# Patient Record
Sex: FEMALE | Race: BLACK OR AFRICAN AMERICAN | Employment: OTHER | URBAN - METROPOLITAN AREA
[De-identification: names, ages, dates, MRNs, and addresses within clinical notes are randomized per-mention and may not be internally consistent; named-entity substitution may affect disease eponyms.]

---

## 2019-03-24 ENCOUNTER — HOSPITAL ENCOUNTER (INPATIENT)
Age: 29
LOS: 3 days | Discharge: HOME | DRG: 885 | End: 2019-03-27
Attending: EMERGENCY MEDICINE | Admitting: PSYCHIATRY & NEUROLOGY
Payer: MEDICARE

## 2019-03-24 DIAGNOSIS — R45.851 SUICIDAL IDEATION: Primary | ICD-10-CM

## 2019-03-24 DIAGNOSIS — F39 MOOD DISORDER (HCC): ICD-10-CM

## 2019-03-24 PROBLEM — F32.A DEPRESSION WITH SUICIDAL IDEATION: Status: ACTIVE | Noted: 2019-03-24

## 2019-03-24 PROBLEM — F25.1 SCHIZOAFFECTIVE DISORDER, DEPRESSIVE TYPE (HCC): Status: ACTIVE | Noted: 2019-03-24

## 2019-03-24 PROBLEM — F33.2 SEVERE EPISODE OF RECURRENT MAJOR DEPRESSIVE DISORDER, WITHOUT PSYCHOTIC FEATURES (HCC): Status: ACTIVE | Noted: 2019-03-24

## 2019-03-24 LAB
ACETAMINOPHEN LEVEL: <5 MCG/ML (ref 10–30)
ALBUMIN SERPL-MCNC: 4.1 G/DL (ref 3.5–5.2)
ALP BLD-CCNC: 93 U/L (ref 35–104)
ALT SERPL-CCNC: 13 U/L (ref 0–32)
AMPHETAMINE SCREEN, URINE: NOT DETECTED
ANION GAP SERPL CALCULATED.3IONS-SCNC: 12 MMOL/L (ref 7–16)
AST SERPL-CCNC: 15 U/L (ref 0–31)
BACTERIA: ABNORMAL /HPF
BACTERIA: ABNORMAL /HPF
BARBITURATE SCREEN URINE: NOT DETECTED
BENZODIAZEPINE SCREEN, URINE: NOT DETECTED
BILIRUB SERPL-MCNC: <0.2 MG/DL (ref 0–1.2)
BILIRUBIN URINE: NEGATIVE
BILIRUBIN URINE: NEGATIVE
BLOOD, URINE: NEGATIVE
BLOOD, URINE: NEGATIVE
BUN BLDV-MCNC: 14 MG/DL (ref 6–20)
CALCIUM SERPL-MCNC: 9.2 MG/DL (ref 8.6–10.2)
CANNABINOID SCREEN URINE: NOT DETECTED
CHLORIDE BLD-SCNC: 100 MMOL/L (ref 98–107)
CLARITY: CLEAR
CLARITY: CLEAR
CO2: 25 MMOL/L (ref 22–29)
COCAINE METABOLITE SCREEN URINE: NOT DETECTED
COLOR: YELLOW
COLOR: YELLOW
CREAT SERPL-MCNC: 1.1 MG/DL (ref 0.5–1)
EPITHELIAL CELLS, UA: ABNORMAL /HPF
ETHANOL: <10 MG/DL (ref 0–0.08)
GFR AFRICAN AMERICAN: >60
GFR NON-AFRICAN AMERICAN: >60 ML/MIN/1.73
GLUCOSE BLD-MCNC: 161 MG/DL (ref 74–99)
GLUCOSE URINE: 100 MG/DL
GLUCOSE URINE: NEGATIVE MG/DL
HBA1C MFR BLD: 5.7 % (ref 4–5.6)
HCG, URINE, POC: NEGATIVE
HCT VFR BLD CALC: 33.8 % (ref 34–48)
HEMOGLOBIN: 9.9 G/DL (ref 11.5–15.5)
KETONES, URINE: ABNORMAL MG/DL
KETONES, URINE: NEGATIVE MG/DL
LEUKOCYTE ESTERASE, URINE: ABNORMAL
LEUKOCYTE ESTERASE, URINE: NEGATIVE
Lab: NORMAL
MCH RBC QN AUTO: 20.8 PG (ref 26–35)
MCHC RBC AUTO-ENTMCNC: 29.3 % (ref 32–34.5)
MCV RBC AUTO: 70.9 FL (ref 80–99.9)
METER GLUCOSE: 105 MG/DL (ref 74–99)
METHADONE SCREEN, URINE: NOT DETECTED
NEGATIVE QC PASS/FAIL: NORMAL
NITRITE, URINE: NEGATIVE
NITRITE, URINE: NEGATIVE
OPIATE SCREEN URINE: NOT DETECTED
PDW BLD-RTO: 16.3 FL (ref 11.5–15)
PH UA: 5.5 (ref 5–9)
PH UA: 6 (ref 5–9)
PHENCYCLIDINE SCREEN URINE: NOT DETECTED
PLATELET # BLD: 470 E9/L (ref 130–450)
PMV BLD AUTO: 10.5 FL (ref 7–12)
POSITIVE QC PASS/FAIL: NORMAL
POTASSIUM SERPL-SCNC: 3.8 MMOL/L (ref 3.5–5)
PROPOXYPHENE SCREEN: NOT DETECTED
PROTEIN UA: 100 MG/DL
PROTEIN UA: NEGATIVE MG/DL
RBC # BLD: 4.77 E12/L (ref 3.5–5.5)
RBC UA: ABNORMAL /HPF (ref 0–2)
RBC UA: ABNORMAL /HPF (ref 0–2)
SALICYLATE, SERUM: <0.3 MG/DL (ref 0–30)
SODIUM BLD-SCNC: 137 MMOL/L (ref 132–146)
SPECIFIC GRAVITY UA: 1.02 (ref 1–1.03)
SPECIFIC GRAVITY UA: >=1.03 (ref 1–1.03)
TOTAL PROTEIN: 7.9 G/DL (ref 6.4–8.3)
TRICHOMONAS: ABNORMAL /HPF
TRICYCLIC ANTIDEPRESSANTS SCREEN SERUM: NEGATIVE NG/ML
UROBILINOGEN, URINE: 0.2 E.U./DL
UROBILINOGEN, URINE: 0.2 E.U./DL
WBC # BLD: 11.8 E9/L (ref 4.5–11.5)
WBC UA: ABNORMAL /HPF (ref 0–5)
WBC UA: ABNORMAL /HPF (ref 0–5)

## 2019-03-24 PROCEDURE — 99285 EMERGENCY DEPT VISIT HI MDM: CPT

## 2019-03-24 PROCEDURE — 6370000000 HC RX 637 (ALT 250 FOR IP): Performed by: NURSE PRACTITIONER

## 2019-03-24 PROCEDURE — 81001 URINALYSIS AUTO W/SCOPE: CPT

## 2019-03-24 PROCEDURE — 82962 GLUCOSE BLOOD TEST: CPT

## 2019-03-24 PROCEDURE — 83036 HEMOGLOBIN GLYCOSYLATED A1C: CPT

## 2019-03-24 PROCEDURE — 6370000000 HC RX 637 (ALT 250 FOR IP): Performed by: FAMILY MEDICINE

## 2019-03-24 PROCEDURE — 80053 COMPREHEN METABOLIC PANEL: CPT

## 2019-03-24 PROCEDURE — 99222 1ST HOSP IP/OBS MODERATE 55: CPT | Performed by: NURSE PRACTITIONER

## 2019-03-24 PROCEDURE — 36415 COLL VENOUS BLD VENIPUNCTURE: CPT

## 2019-03-24 PROCEDURE — 85027 COMPLETE CBC AUTOMATED: CPT

## 2019-03-24 PROCEDURE — G0480 DRUG TEST DEF 1-7 CLASSES: HCPCS

## 2019-03-24 PROCEDURE — 80307 DRUG TEST PRSMV CHEM ANLYZR: CPT

## 2019-03-24 PROCEDURE — 1240000000 HC EMOTIONAL WELLNESS R&B

## 2019-03-24 RX ORDER — ACETAMINOPHEN 325 MG/1
650 TABLET ORAL EVERY 4 HOURS PRN
Status: DISCONTINUED | OUTPATIENT
Start: 2019-03-24 | End: 2019-03-27 | Stop reason: HOSPADM

## 2019-03-24 RX ORDER — HYDROXYZINE PAMOATE 50 MG/1
50 CAPSULE ORAL EVERY 6 HOURS PRN
Status: DISCONTINUED | OUTPATIENT
Start: 2019-03-24 | End: 2019-03-27 | Stop reason: HOSPADM

## 2019-03-24 RX ORDER — LISINOPRIL 5 MG/1
5 TABLET ORAL DAILY
Status: DISCONTINUED | OUTPATIENT
Start: 2019-03-24 | End: 2019-03-27 | Stop reason: HOSPADM

## 2019-03-24 RX ORDER — DIPHENHYDRAMINE HCL 25 MG
25 TABLET ORAL 2 TIMES DAILY
Status: DISCONTINUED | OUTPATIENT
Start: 2019-03-24 | End: 2019-03-27 | Stop reason: HOSPADM

## 2019-03-24 RX ORDER — OLANZAPINE 10 MG/1
10 TABLET ORAL
Status: ACTIVE | OUTPATIENT
Start: 2019-03-24 | End: 2019-03-24

## 2019-03-24 RX ORDER — MAGNESIUM HYDROXIDE/ALUMINUM HYDROXICE/SIMETHICONE 120; 1200; 1200 MG/30ML; MG/30ML; MG/30ML
30 SUSPENSION ORAL PRN
Status: DISCONTINUED | OUTPATIENT
Start: 2019-03-24 | End: 2019-03-27 | Stop reason: HOSPADM

## 2019-03-24 RX ORDER — BENZTROPINE MESYLATE 1 MG/ML
2 INJECTION INTRAMUSCULAR; INTRAVENOUS 2 TIMES DAILY PRN
Status: DISCONTINUED | OUTPATIENT
Start: 2019-03-24 | End: 2019-03-27 | Stop reason: HOSPADM

## 2019-03-24 RX ORDER — DESVENLAFAXINE 50 MG/1
50 TABLET, EXTENDED RELEASE ORAL DAILY
Status: DISCONTINUED | OUTPATIENT
Start: 2019-03-24 | End: 2019-03-27 | Stop reason: HOSPADM

## 2019-03-24 RX ORDER — TRAZODONE HYDROCHLORIDE 50 MG/1
50 TABLET ORAL NIGHTLY PRN
Status: DISCONTINUED | OUTPATIENT
Start: 2019-03-24 | End: 2019-03-27 | Stop reason: HOSPADM

## 2019-03-24 RX ORDER — METRONIDAZOLE 500 MG/1
2000 TABLET ORAL ONCE
Status: COMPLETED | OUTPATIENT
Start: 2019-03-24 | End: 2019-03-24

## 2019-03-24 RX ORDER — HALOPERIDOL 5 MG
5 TABLET ORAL 2 TIMES DAILY
Status: DISCONTINUED | OUTPATIENT
Start: 2019-03-24 | End: 2019-03-27 | Stop reason: HOSPADM

## 2019-03-24 RX ORDER — HALOPERIDOL 5 MG/ML
10 INJECTION INTRAMUSCULAR EVERY 6 HOURS PRN
Status: DISCONTINUED | OUTPATIENT
Start: 2019-03-24 | End: 2019-03-27 | Stop reason: HOSPADM

## 2019-03-24 RX ADMIN — HALOPERIDOL 5 MG: 5 TABLET ORAL at 09:55

## 2019-03-24 RX ADMIN — LISINOPRIL 5 MG: 5 TABLET ORAL at 17:04

## 2019-03-24 RX ADMIN — DIPHENHYDRAMINE HCL 25 MG: 25 TABLET ORAL at 09:55

## 2019-03-24 RX ADMIN — METRONIDAZOLE 2000 MG: 500 TABLET, FILM COATED ORAL at 21:41

## 2019-03-24 RX ADMIN — DESVENLAFAXINE SUCCINATE 50 MG: 50 TABLET, FILM COATED, EXTENDED RELEASE ORAL at 09:55

## 2019-03-24 RX ADMIN — HALOPERIDOL 5 MG: 5 TABLET ORAL at 21:41

## 2019-03-24 RX ADMIN — DIPHENHYDRAMINE HCL 25 MG: 25 TABLET ORAL at 21:41

## 2019-03-24 ASSESSMENT — SLEEP AND FATIGUE QUESTIONNAIRES
SLEEP PATTERN: DIFFICULTY FALLING ASLEEP;DISTURBED/INTERRUPTED SLEEP;INSOMNIA;RESTLESSNESS
DIFFICULTY FALLING ASLEEP: YES
DIFFICULTY STAYING ASLEEP: YES
RESTFUL SLEEP: NO
DIFFICULTY ARISING: YES
DO YOU USE A SLEEP AID: NO
DO YOU HAVE DIFFICULTY SLEEPING: YES

## 2019-03-24 ASSESSMENT — PATIENT HEALTH QUESTIONNAIRE - PHQ9: SUM OF ALL RESPONSES TO PHQ QUESTIONS 1-9: 27

## 2019-03-24 ASSESSMENT — LIFESTYLE VARIABLES: HISTORY_ALCOHOL_USE: YES

## 2019-03-24 NOTE — ED PROVIDER NOTES
U/L    ALT 13 0 - 32 U/L    AST 15 0 - 31 U/L   Serum Drug Screen   Result Value Ref Range    Ethanol Lvl <10 mg/dL    Acetaminophen Level <5.0 (L) 10.0 - 50.7 mcg/mL    Salicylate, Serum <8.8 0.0 - 30.0 mg/dL    TCA Scrn NEGATIVE Cutoff:300 ng/mL   Urine Drug Screen   Result Value Ref Range    Amphetamine Screen, Urine NOT DETECTED Negative <1000 ng/mL    Barbiturate Screen, Ur NOT DETECTED Negative < 200 ng/mL    Benzodiazepine Screen, Urine NOT DETECTED Negative < 200 ng/mL    Cannabinoid Scrn, Ur NOT DETECTED Negative < 50ng/mL    Cocaine Metabolite Screen, Urine NOT DETECTED Negative < 300 ng/mL    Opiate Scrn, Ur NOT DETECTED Negative < 300ng/mL    PCP Screen, Urine NOT DETECTED Negative < 25 ng/mL    Methadone Screen, Urine NOT DETECTED Negative <300 ng/mL    Propoxyphene Scrn, Ur NOT DETECTED Negative <300 ng/mL   Urinalysis   Result Value Ref Range    Color, UA Yellow Straw/Yellow    Clarity, UA Clear Clear    Glucose, Ur 100 (A) Negative mg/dL    Bilirubin Urine Negative Negative    Ketones, Urine TRACE (A) Negative mg/dL    Specific Gravity, UA >=1.030 1.005 - 1.030    Blood, Urine Negative Negative    pH, UA 5.5 5.0 - 9.0    Protein,  (A) Negative mg/dL    Urobilinogen, Urine 0.2 <2.0 E.U./dL    Nitrite, Urine Negative Negative    Leukocyte Esterase, Urine TRACE (A) Negative   Microscopic Urinalysis   Result Value Ref Range    WBC, UA 2-5 0 - 5 /HPF    RBC, UA 0-1 0 - 2 /HPF    Epi Cells MANY /HPF    Bacteria, UA MODERATE (A) /HPF   POC Pregnancy Urine Qual   Result Value Ref Range    HCG, Urine, POC Negative Negative    Lot Number [id number]     Positive QC Pass/Fail Pass     Negative QC Pass/Fail Pass        RADIOLOGY:  Interpreted by Radiologist.  No orders to display         ------------------------- NURSING NOTES AND VITALS REVIEWED ---------------------------   The nursing notes within the ED encounter and vital signs as below have been reviewed by myself.   BP (!) 150/104   Pulse 112   Temp 97 °F (36.1 °C)   Resp 18   Ht 5' 7\" (1.702 m)   Wt 220 lb (99.8 kg)   SpO2 99%   BMI 34.46 kg/m²   Oxygen Saturation Interpretation: Normal    The patients available past medical records and past encounters were reviewed. ------------------------------ ED COURSE/MEDICAL DECISION MAKING----------------------  Medications - No data to display    Medical Decision Making:    Patient assessed. Labs Will be reviewed and  to evaluate  This patient's ED course included: a personal history and physical examination and re-evaluation prior to disposition. This patient has been closely monitored during their ED course. Re-Evaluations:           Re-evaluation. Patients symptoms show no change. Patient is  medically clear  Consultations:               Counseling: The emergency provider has spoken with the patient and discussed todays results, in addition to providing specific details for the plan of care and counseling regarding the diagnosis and prognosis. Questions are answered at this time and they are agreeable with the plan.     --------------------------------- IMPRESSION AND DISPOSITION ---------------------------------    IMPRESSION  1. Suicidal ideation    2. Mood disorder ([facility] [address]        DISPOSITION  Disposition:  to evaluate  Patient condition is stable    3/24/19, 1:44 AM.    This note is prepared by Damien Arguelles, acting as Scribe for Zachariah Patel MD.    Zachariah Patel MD:  The scribe's documentation has been prepared under my direction and personally reviewed by me in its entirety. I confirm that the note above accurately reflects all work, treatment, procedures, and medical decision making performed by me.          Zachariah Patel MD  03/24/19 4083       Zachariah Patel MD  03/24/19 9988

## 2019-03-24 NOTE — ED NOTES
Pt is alert, calm, and cooperative. States she had a fight with her brother tonight and that prompted her visit. Pt states SI and HI. This has been worsening since last September. Pt moved here from [address] [place] [address] last July. Pt reports multiple plans such as making a bomb, knifing self, shooting self with a gun, or poisoning self. Pt has has multiple SA in 2015, 2016, 2017, and 2018 with attempted hanging, cutting wrist, and had a gun in her hand at one time and cousin found her. .Pt was in treatment there at [facility] where she saw a Psychiatrist and therapist.. Pt states she was on \"pills and shots\". Hx of HTN, Asthma, Borderline diabetic, Schizophrenia, Bipolar, Depression. Pt resides here in [place] and her best friend and his son are staying with her. Pt has 4 children who resides in [address] [place] [address] with her Step-Mom until she \"gets it together\". Pt had used [employer] in [address] [place] [address]. Pt reports she has been up for nearly 6 days straight. Reports AH telling her to \"get them\" or harm self. Pt has VH of \"little people running around\". Labs drawn and sent. Pt eating and resting in room.  Pt is courtney Myrick RN  03/24/19 [address] [place] [address] Dione Christopher RN  03/24/19 9757

## 2019-03-24 NOTE — H&P
PSYCHIATRIC EVALUATION  (HISTORY & PHYSICAL)     CHIEF COMPLAINT:   [x] Mood Problems [x] Anxiety Problems [] Psychosis                    [x] Suicidal/Homicidal   [] Aggression  [] Other    HISTORY OF PRESENT ILLNESS: Jihan Lemus  is a 25 y.o. female who has a previous psychiatric history of schizoaffective disorder presents for admission with SI with plan to stab herself and HI toward brother. Symptoms onset was a few months ago and is becoming severe for the last day. This presentation associates with [employer] telling her to \"get 'em\", increased depression/anxiety, and poor sleep. Symptoms are constant and usually is worsened by medication noncompliance. Precipitating factor:  Patient had altercation with friend that is like a brother and his girlfriend. Precipitating Factors:     [x] Family Stress   [] Recent loss/grief Stress   [] Health Stress   [] Relationship Stress    [] Legal Stress   [x] Environmental Stress    [] Occupational Stress   [] Financial Stress   [] Substance Abuse [] Other      PAST PSYCHIATRIC HISTORY:     History of psychiatric Hospitalization:    [] Denies    [x] yes  [] Days ago     []  Weeks Ago    [] Months ago  [] Years ago              [] [facility]  [] [facility]  [] Other:        [] Once  [x] More than once    Outpatient treatment:  [] [facility]  [] [facility]  [] [facility]              [] [facility]  [] [facility]      [] [address] [place] [address] [] [facility]      [] [facility] [] [facility]  [] [facility] [] [facility]  [] Other               [] currently  [] in the past  [] Non-Compliant    [] Denies    Previous suicide attempt: []Denies                [x] yes  [] OD  [] Cutting  [] Hanging  [] Gun  [] Other    Previous psych medications:  [x] Was prescribed               [] Currently Taking       [] Never taken medications      PAST MEDICAL HISTORY: History reviewed. No pertinent past medical history. FAMILY PSYCHIATRIC HISTORY:  History reviewed. No pertinent family history.      [] Denies      [x] Endorses    [] Father     [] Depression  [] Anxiety  [] Bipolar  [] Psychosis  []  Other      [x] Mother    [] Depression  [] Anxiety  [] Bipolar  [] Psychosis  []  Other      [] Sibling    [] Depression  [] Anxiety  [] Bipolar  [] Psychosis  []  Other      [] Grandparent    [] Depression  [] Anxiety  [] Bipolar  [] Psychosis  []  Other      SOCIAL HISTORY:     1. Living Situation:[x] Private Residence [] Homeless [] [address]             [] Assisted Living [] [address]  [] Shelter [] Other   2. Employment:  [] Yes  [] No   [] Disability   [] Retired  3. Legal History: [x] No Arrest [] Arrest  [] Theft  []  Assault  [] Substances   4. History of Trama/ Abuse: [] Denies  [] Emotional [] Physical [] Sexual   5. Spirituality: [] Spiritual [x] Not Spiritual   6. Substance Abuse: [x] Denies  [x] Drug of choice      [] Amphetamines [x] Marijuana [x] Cocaine      [] Opioids  [] Alcohol  [] Benzodiazepines   [] Other: For further SH review SW note. Risk Assessment:  1. Risk Factors:   [x] Depression  [x] Anxiety  [] Psychosis   [x] Suicidal/Homicidal Thoughts [] Suicide Attempt [] Substance Abuse     2. Protective Factors: [x] Controlled Environment         [x] Supportive Family []         [] Christianity Support     3. Level of Risk: [] Mild [] Moderate [x] Severe      Strengths & Weaknesses:    1. Strengths: [x] Ability to communicate feelings     [x] Independent ADL's     [x] Supportive Family    [] Current Health Status     2.  Weaknesses: [x] Emotional          [x] Motivational     MEDICATIONS: Current Facility-Administered Medications: acetaminophen (TYLENOL) tablet 650 mg, 650 mg, Oral, Q4H PRN  hydrOXYzine (VISTARIL) capsule 50 mg, 50 mg, Oral, Q6H PRN  OLANZapine (ZYPREXA) tablet 10 mg, 10 mg, Oral, Once PRN  haloperidol lactate (HALDOL) injection 10 mg, 10 mg, Intramuscular, Q6H PRN  traZODone (DESYREL) tablet 50 mg, 50 mg, Oral, Nightly PRN  benztropine mesylate (COGENTIN) injection 2 mg, 2 mg, Intramuscular, BID PRN  magnesium hydroxide (MILK OF MAGNESIA) 400 MG/5ML suspension 30 mL, 30 mL, Oral, Daily PRN  aluminum & magnesium hydroxide-simethicone (MAALOX) 200-200-20 MG/5ML suspension 30 mL, 30 mL, Oral, PRN    Medical Review of Systems:     All other than marked systmes have been reviewed and are all negative.     Constitutional Symptoms: []  fever []  Chills  Skin Symptoms: [] rash []  Pruritus   Eye Symptoms: [] Vision unchanged []  recent vision problems[] blurred vision   Respiratory Symptoms:[] shortness of breath [] cough  Cardiovascular Symptoms:  [] chest pain   [] palpitations   Gastrointestinal Symptoms: []  abdominal pain []  nausea []  vomiting []  diarrhea  Genitourinary Symptoms: []  dysuria  []  hematuria   Musculoskeletal Symptoms: []  back pain []  muscle pain []  joint pain  Neurologic Symptoms: []  headache []  dizziness  Hematolymphoid Symptoms: [] Adenopathy [] Bruises   [] Schimosis       VITALS: BP (!) 163/99   Pulse 93   Temp 97.3 °F (36.3 °C)   Resp 16   Ht 5' 7\" (1.702 m)   Wt 220 lb (99.8 kg)   SpO2 98%   BMI 34.46 kg/m²     ALLERGIES: Nuts [peanut-containing drug products]            Physical Examination:    Head:  [x] Atraumatic:  [x] normocephalic  Skin and Mucosa       [] Moist [] Dry [] Pale [x] Normal   Neck: [x] Thyroid [] Palpable    [x] Not palpable []  venus distention [] adenopathy   Chest: [x] Clear [] Rhonchi  [] Wheezing   CV: [x] S1 [x] S2 [x] No murmer   Abdomen:  [x] Soft   [] Tender  [] Viceromegaly   Extremities:  [x] No Edema   [] Edema    Cranial Nerves Examination:    CN II: [x] Pupils are reactive to light [] Pupils are non reactive to light  CN III, IV, VI:[x] No eye deviation  [x] No diplopia or ptosis   CN V: [x] Facial Sensation is intact  [] Facial Sensation is not intact   CN IIIV:  [x] Hearing is normal to rubbing fingers   CN IX, X:  [x] Normal gag reflex and phonation   CN XI: [x] Shoulder shrug and neck rotation is normal  CNXII: [x] Tongue is midline no deviation or atrophy       For further PE refer to ED note      MENTAL STATUS EXAM:       Cognition:      [x] Alert  [x] Awake  [x] Oriented  [x] Person  [x] Place [x] Time      [] drowsy  [] tired  [] lethargic  [] distractable     Attention/Concentration:   [x] Attentive  [] Distracted        Memory Recent and Remote: [] Intact   [] Impaired [] Partially Impaired     Language: [] Able to recognize and name objects          [] Unable to recognize and name Objects    Fund of Knowledge:  [] Poor [x]  Fair  [] Good    Speech: [] Normal  [x] Soft  [] Slow  [] Fast [] Pressured            [] Loud [] Dysarthria  [] Incoherent       Appearance: [] Well Groomed  [] Casual Dressed  [] Unkept  [] Disheveled          [] Normal weight  [] Thin  [x] Overweight  [] Obese           Attitude: [] Positive  [] Hostile  [] Demanding  [] Guarded  [] Defensive         [x] Cooperative  []  Uncooperative      Behavior:  [x] Normal Gait  [] Abnormal Gait [] Walks with Assistance  [] [place]     [] Walks with Shant Older  [] In Hospital Bed  [] Sitting in Chair    Muscle-Skeletal:  [x] Normal Muscle Tone [] Muscle Atrophy            [] Abnormal Muscle Movement     Eye Contact:  [] Good eye contact  [] Intermittent Eye Contact  [x] Poor Eye Contact    [] Excessive Eye Contact   [] Intrusive Eye Contact    Mood: [x] Depressed  [x] Anxious  [] Irritated  [] Euthymic   [] Angry [] Restless                   [] Apathetic    Affect:  [x] Congruent  [] Incongruent  [] Labile  [] Constricted  [x] Flat  [x] Bizarre                     [] Heightened    [] Exaggerated      Thought Process and Association:  [] Logical [] Illogical       [x] Linear and Goal Directed  [] Tangential  [] Circumstantial     Thought Content:  [] Denies [x] Endorses [x] Suicidal [x] Homicidal  [] Delusional      [] Paranoid  [] Somatic  [] Grandiose    Perception: []  None  [x] Auditory   [] Visual  [] tactile   [] olfactory  []

## 2019-03-24 NOTE — CONSULTS
Hospital Medicine consult     Date of Admission: 3/24/2019    Chief Complaint:  Prediabetes in psych     History Of Present Illness:      25 y.o. female who presented to [facility] presented with SI. He was medically cleared in the ED and sent to inpatient psych. Prediabetes  And HTN noted in past. She has been noncompliant with meds for several months. The patient denies any fever, cough, chest pain, shortness of breath, nausea and vomiting. No dysuria. Past Medical History:          Diagnosis Date    HTN (hypertension)     Prediabetes        Past Surgical History:      History reviewed. No pertinent surgical history. Medications Prior to Admission:      Prior to Admission medications    Not on File       Allergies:  Nuts [peanut-containing drug products]    Social History:    TOBACCO:   has no tobacco history on file. ETOH:   reports that she drank alcohol. Family History:    History reviewed. No pertinent family history. REVIEW OF SYSTEMS:   Pertinent positives as noted in the HPI. All other systems reviewed and negative. PHYSICAL EXAM:  BP (!) 163/99   Pulse 93   Temp 97.3 °F (36.3 °C)   Resp 16   Ht 5' 7\" (1.702 m)   Wt 220 lb (99.8 kg)   SpO2 98%   BMI 34.46 kg/m²   General appearance: No apparent distress, appears stated age and cooperative. HEENT: Normal cephalic, atraumatic without obvious deformity. Pupils equal, round, and reactive to light. Extra ocular muscles intact. Conjunctivae/corneas clear. Neck: Supple, with full range of motion. No jugular venous distention. Trachea midline. Respiratory:  Normal respiratory effort. Clear to auscultation, bilaterally without Rales/Wheezes/Rhonchi. Cardiovascular: Regular rate and rhythm with normal S1/S2 without murmurs, rubs or gallops. Abdomen: Soft, non-tender, non-distended with normal bowel sounds. Musculoskeletal: No clubbing, cyanosis or edema bilaterally. Full range of motion without deformity.   Skin: Normal skin

## 2019-03-24 NOTE — CARE COORDINATION
HAYLEY note: d/c planning: SW met with pt and completed assessment and columbia scale. Pt states she had was getting shots at [facility] in [address] [place] [address] and then moved here and hadn't followed with anyone here. Pt states she hasn't gotten an injection since august 2017 and would like to resume injections. Pt states that she can return home to her own home. She had allowed her bro and his son to live with her but they aren't living there anymore. Pt states she has no one she is close to. She gets ss and is on medicare.

## 2019-03-24 NOTE — PROGRESS NOTES
Pt contract for safety and has no current suicidal thoughts but had homicidal thoughts earlier today but stated after a nap she is thinking much clearer and no longer homicidal. Pt also stated that the voices she was hearing to kill her friend disappeared after her nap as well.

## 2019-03-24 NOTE — PROGRESS NOTES
Patient declined to attend community meeting. Attempted to have patient identify goal for the day but patient was asleep AEB snoring.

## 2019-03-24 NOTE — BH NOTE
`[facility]  Admission Note   Patient comes to the unit and states that she's been off of her meds for a while and came in for treatment. When asked to elaborate as to the specific reasons that brought her to the hospital as told to the previous nurse in the [facility] patient became evasive and guarded. Admission Type:   Admission Type: Involuntary    Reason for admission:  Reason for Admission: \"I've been off my medicine\"    PATIENT STRENGTHS:  Strengths: No significant Physical Illness    Patient Strengths and Limitations:  Limitations: Difficult relationships / poor social skills, Demonstrates discomfort with /lack of social skills, Apathetic / unmotivated, Lacks leisure interests, Tendency to isolate self    Addictive Behavior:   Addictive Behavior  In the past 3 months, have you felt or has someone told you that you have a problem with:  : None  Do you have a history of Chemical Use?: No  Do you have a history of Alcohol Use?: Yes  Do you have a history of Street Drug Abuse?: Yes  Histroy of Prescripton Drug Abuse?: No    Medical Problems:   History reviewed. No pertinent past medical history. Status EXAM:  Status and Exam  Normal: No  Facial Expression: Avoids Gaze  Affect: Blunt  Level of Consciousness: Alert  Mood:Normal: No  Mood: Depressed  Motor Activity:Normal: Yes  Interview Behavior: Cooperative  Preception: [place] to Person, Mariah Stack to Time, [place] to Place, [place] to Situation  Attention:Normal: Yes  Thought Content:Normal: No  Thought Content: Poverty of Content  Hallucinations:  Auditory (Comment), Visual (Comment)(hearing voices that say run, kill myself, get out here; patient sees making bombs)  Delusions: Yes  Delusions: Grandeur  Memory:Normal: No  Memory: Confabulation  Insight and Judgment: No  Insight and Judgment: Poor Judgment, Poor Insight  Present Suicidal Ideation: Yes  Present Homicidal Ideation: Yes    Tobacco Screening:  Practical Counseling, on admission, cherie TEJADA if applicable and completed (first 3 are required if patient doesn't refuse):            ( )  Recognizing danger situations (included triggers and roadblocks)                    ( )  Coping skills (new ways to manage stress, exercise, relaxation techniques, changing routine, distraction)                                                           ( )  Basic information about quitting (benefits of quitting, techniques in how to quit, available resources  ( ) Referral for counseling faxed to [facility]                                           ( ) Patient refused counseling  ( ) Patient has not smoked in the last 30 days    Metabolic Screening:    No results found for: LABA1C    No results for input(s): CHOL, TRIG, HDL, LDLCALC, LABVLDL in the last 72 hours. Body mass index is 34.46 kg/m². BP Readings from Last 2 Encounters:   03/24/19 (!) 163/99           Pt admitted with followings belongings:  Clothing: Footwear(1 pr black shoes, zip up hoodie jacket, 1 pr knit pant, 1 purple V neck ilya shirt)  Other Valuables: Cell phone, Sharri Tipton sent home with n/a. Valuables placed in safe in security envelope, number: n/a. Patient's home medications were n/a. Patient oriented to surroundings and program expectations and copy of patient rights given. Received admission packet:  yes. Consents reviewed, signed yes. Refused n/a. Patient verbalize understanding:  yes.     Patient education on precautions: yes                  Darlene Luis RN

## 2019-03-24 NOTE — ED NOTES
Bed: [facility]  Expected date:   Expected time:   Means of arrival:   Comments:  ems     [address], RN  03/24/19 1528

## 2019-03-25 PROBLEM — R73.03 PREDIABETES: Status: ACTIVE | Noted: 2019-03-25

## 2019-03-25 PROBLEM — E11.9 TYPE 2 DIABETES MELLITUS WITHOUT COMPLICATION, WITHOUT LONG-TERM CURRENT USE OF INSULIN (HCC): Status: ACTIVE | Noted: 2019-03-25

## 2019-03-25 PROBLEM — I10 HTN (HYPERTENSION): Status: ACTIVE | Noted: 2019-03-25

## 2019-03-25 PROCEDURE — 6370000000 HC RX 637 (ALT 250 FOR IP): Performed by: NURSE PRACTITIONER

## 2019-03-25 PROCEDURE — 1240000000 HC EMOTIONAL WELLNESS R&B

## 2019-03-25 PROCEDURE — 99231 SBSQ HOSP IP/OBS SF/LOW 25: CPT | Performed by: NURSE PRACTITIONER

## 2019-03-25 PROCEDURE — 6370000000 HC RX 637 (ALT 250 FOR IP): Performed by: PSYCHIATRY & NEUROLOGY

## 2019-03-25 PROCEDURE — 6370000000 HC RX 637 (ALT 250 FOR IP): Performed by: CLINICAL NURSE SPECIALIST

## 2019-03-25 RX ORDER — ONDANSETRON 4 MG/1
4 TABLET, ORALLY DISINTEGRATING ORAL EVERY 6 HOURS PRN
Status: DISCONTINUED | OUTPATIENT
Start: 2019-03-25 | End: 2019-03-27 | Stop reason: HOSPADM

## 2019-03-25 RX ADMIN — DIPHENHYDRAMINE HCL 25 MG: 25 TABLET ORAL at 13:05

## 2019-03-25 RX ADMIN — DESVENLAFAXINE SUCCINATE 50 MG: 50 TABLET, FILM COATED, EXTENDED RELEASE ORAL at 13:05

## 2019-03-25 RX ADMIN — TRAZODONE HYDROCHLORIDE 50 MG: 50 TABLET ORAL at 21:07

## 2019-03-25 RX ADMIN — ONDANSETRON 4 MG: 4 TABLET, ORALLY DISINTEGRATING ORAL at 11:51

## 2019-03-25 RX ADMIN — METFORMIN HYDROCHLORIDE 500 MG: 500 TABLET ORAL at 13:04

## 2019-03-25 RX ADMIN — LISINOPRIL 5 MG: 5 TABLET ORAL at 13:04

## 2019-03-25 RX ADMIN — DIPHENHYDRAMINE HCL 25 MG: 25 TABLET ORAL at 21:07

## 2019-03-25 RX ADMIN — HALOPERIDOL 5 MG: 5 TABLET ORAL at 21:07

## 2019-03-25 RX ADMIN — HALOPERIDOL 5 MG: 5 TABLET ORAL at 13:04

## 2019-03-25 ASSESSMENT — PAIN SCALES - GENERAL: PAINLEVEL_OUTOF10: 0

## 2019-03-25 NOTE — PLAN OF CARE
Patient pleasant and cooperative. Denies SI, HI and hallucinations. Patient complains of nausea and was dry heaving after breakfast. Pt AM meds held until patient felt better after PRN Zofran given around lunch. Patient has been isolative to room most of shift except meal times. No unit problems observed or reported.  Will continue to observe and support

## 2019-03-25 NOTE — PROGRESS NOTES
Patient did not attend goals group as she was not feeling well but stated daily goal as feel better.

## 2019-03-25 NOTE — CARE COORDINATION
SW met with pt briefly to discuss d/c plans. Pt reported that she lives alone and would like to return there following d/c. Pt stated that there are no friends or family members SW can contact for collateral. Pt agreeable with follow-up appointments when ready for d/c.

## 2019-03-25 NOTE — PROGRESS NOTES
Hospitalist Progress Note      PCP: No primary care provider on file. Date of Admission: 3/24/2019    Chief Complaint: medical [facility]    Hospital Course:   25 y.o. female who presented to [place] presented with SI. He was medically cleared in the ED and sent to inpatient psych. Prediabetes  And HTN noted in past. She has been noncompliant with meds for several months. The patient denies any fever, cough, chest pain, shortness of breath, nausea and vomiting. No dysuria. 3/25/19: BP stable. Daily BGL ordered. zofran for nausea. Subjective:   Patient lying in bed. She is complaining of nausea, no further complaints at the present time. Medications:  Reviewed    Infusion Medications   Scheduled Medications    haloperidol  5 mg Oral BID    desvenlafaxine succinate  50 mg Oral Daily    diphenhydrAMINE  25 mg Oral BID    lisinopril  5 mg Oral Daily    metFORMIN  500 mg Oral Daily with breakfast     PRN Meds: acetaminophen, hydrOXYzine, haloperidol lactate, traZODone, benztropine mesylate, magnesium hydroxide, aluminum & magnesium hydroxide-simethicone      Intake/Output Summary (Last 24 hours) at 3/25/2019 0953  Last data filed at 3/25/2019 0911  Gross per 24 hour   Intake 720 ml   Output --   Net 720 ml       Exam:    /89   Pulse 76   Temp 97.3 °F (36.3 °C)   Resp 16   Ht 5' 7\" (1.702 m)   Wt 220 lb (99.8 kg)   SpO2 98%   BMI 34.46 kg/m²     General appearance: No apparent distress, appears stated age and cooperative. HEENT: Pupils equal, round, and reactive to light. Conjunctivae/corneas clear. Neck: Supple, with full range of motion. No jugular venous distention. Trachea midline. Respiratory:  Normal respiratory effort. Cardiovascular: Regular rate and rhythm   Abdomen: Soft, non-tender, non-distended   Musculoskeletal: No clubbing, cyanosis or edema bilaterally. Full range of motion without deformity.   Skin: Skin color, texture, turgor normal.  No rashes or lesions. Neurologic:  Neurovascularly intact without any focal sensory/motor deficits. Psychiatric: Alert and oriented, thought content appropriate, normal insight  Capillary Refill: Brisk,< 3 seconds   Peripheral Pulses: +2 palpable, equal bilaterally       Labs:   Recent Labs     03/24/19 0230   WBC 11.8*   HGB 9.9*   HCT 33.8*   *     Recent Labs     03/24/19 0230      K 3.8      CO2 25   BUN 14   CREATININE 1.1*   CALCIUM 9.2     Recent Labs     03/24/19 0230   AST 15   ALT 13   BILITOT <0.2   ALKPHOS 93     No results for input(s): INR in the last 72 hours. No results for input(s): Nanetta Angel in the last 72 hours. Assessment/Plan:    Active Hospital Problems    Diagnosis Date Noted    HTN (hypertension) [I10] 03/25/2019    Type 2 diabetes mellitus without complication, without long-term current use of insulin ([facility] [address] [E11.9] 03/25/2019    Schizoaffective disorder, depressive type ([facility] [address] [F25.1] 03/24/2019     HTN  - monitor BP  - resume home medications    prediabetes  - continue home medications  - monitor BGL daily.     Obesity  - BMI 34.5  - lifestyle changes    Schizoaffective disorder  - management per primary team    DVT Prophylaxis: activity and ambulation  Diet: DIET CARB CONTROL;  Code Status: Full Code    PT/OT Eval Status: n/a    Dispo - per primary    CHRISTINE Oswald - CNS

## 2019-03-25 NOTE — PLAN OF CARE
[address] [facility]  Initial Interdisciplinary Treatment Plan NOTE    Review Date & Time: 3/25/19 0930    Patient was not in treatment team    Admission Type:   Admission Type:  Involuntary    Reason for admission:  Reason for Admission: \"I've been off my medicine\"      Estimated Length of Stay Update:  3-5 days  Estimated Discharge Date Update: 3/29/19    PATIENT STRENGTHS:  Patient Strengths Strengths: No significant Physical Illness  Patient Strengths and Limitations:Limitations: Difficult relationships / poor social skills, Demonstrates discomfort with /lack of social skills, Apathetic / unmotivated, Lacks leisure interests, Tendency to isolate self  Addictive Behavior:Addictive Behavior  In the past 3 months, have you felt or has someone told you that you have a problem with:  : None  Do you have a history of Chemical Use?: No  Do you have a history of Alcohol Use?: Yes  Do you have a history of Street Drug Abuse?: Yes  Histroy of Prescripton Drug Abuse?: No  Medical Problems:  Past Medical History:   Diagnosis Date    HTN (hypertension)     Prediabetes        EDUCATION:   Learner Progress Toward Treatment Goals: Reviewed goals and plan of care    Method: Small group    Outcome: Verbalized understanding    PATIENT GOALS: Patient identified daily goal as \"to feel better\"    PLAN/TREATMENT RECOMMENDATIONS UPDATE:encourage group attendance and provide emotional support    GOALS UPDATE:   Time frame for Short-Term Goals: 1-3 days    Alis Gutierrez RN

## 2019-03-25 NOTE — PROGRESS NOTES
Patient attended and participated in afternoon nursing education group consisting of triggers and how to recognize, avoid and what to do to help relax once you have encountered a trigger.

## 2019-03-25 NOTE — PROGRESS NOTES
DATE OF SERVICE:     3/25/2019    Agnes Navarro seen today for the purpose of continuation of care. Nursing, social work reports, laboratory studies and vital signs are reviewed. Patient chief complaint today is:             [] Depression      [x] Anxiety        [x] Psychosis         [] Suicidal/Homicidal                         [] Delusions           [] Aggression          Subjective: Today patient states that the AVH are improving, still are present. Patient is medication compliant. Denies SI at this time. Sleep:  [] Good [x] Fair  [] Poor  Appetite:  [] Good [x] Fair  [] Poor    Depression:  [] Mild [] Moderate [] Severe                [] Constant [] Sporadic     Anxiety: [] Mild [] Moderate [x] Severe    [x] Constant [] Sporadic     Delusions: [] Mild [x] Moderate [] Severe     [] Constant [x] Sporadic     [x] Paranoid [] Somatic [] Grandiose     Hallucinations: [] Mild [] Moderate [x] Severe     [] Constant [x] Sporadic    [x] Auditory  [] Visual [] Tactile       Suicidal: [] Constant [] Sporadic  Homicidal: [] Constant [] Sporadic    Unscheduled Medications     [] Patient Receiving Emergency Medications \" Chemical Restraint\"   [] Requesting PRN medications for anxiety    Medical Review of Systems:     All other than marked systmes have been reviewed and are all negative.     Constitutional Symptoms: []  fever []  Chills  Skin Symptoms: [] rash []  Pruritus   Eye Symptoms: [] Vision unchanged []  recent vision problems[] blurred vision   Respiratory Symptoms:[] shortness of breath [] cough  Cardiovascular Symptoms:  [] chest pain   [] palpitations   Gastrointestinal Symptoms: []  abdominal pain []  nausea []  vomiting []  diarrhea  Genitourinary Symptoms: []  dysuria  []  hematuria   Musculoskeletal Symptoms: []  back pain []  muscle pain []  joint pain  Neurologic Symptoms: []  headache []  dizziness  Hematolymphoid Symptoms: [] Adenopathy [] Bruises   [] Schimosis       Psychiatric Review of systems  Delusions:  [] Denies [] Endorses   Withdrawals:  [] Denies [] Endorses    Hallucinations: [] Denies [] Endorses    Extra Pyramidal Symptoms: [] Denies [] Endorses      /89   Pulse 76   Temp 97.3 °F (36.3 °C)   Resp 16   Ht 5' 7\" (1.702 m)   Wt 220 lb (99.8 kg)   SpO2 98%   BMI 34.46 kg/m²     Mental Status Examination:    Cognition:      [x] Alert  [x] Awake  [x] Oriented  [x] Person  [x] Place [x] Time      [] drowsy  [] tired  [] lethargic  [] distractable  [] Other     Attention/Concentration:   [x] Attentive  [] Distracted        Memory Recent and Remote: [x] Intact   [] Impaired [] Partially Impaired     Language: [] Able to recognize and name objects          [] Unable to recognize and name Objects    Fund of Knowledge:  [] Poor []  Fair  [x] Good    Speech: [x] Normal  [] Soft  [] Slow  [] Fast [] Pressured            [] Loud [] Dysarthria  [] Incoherent    Appearance: [] Well Groomed  [x] Casual Dressed  [] Unkept  [] Disheveled          [] Normal weight[] Thin  [] Overweight  [] Obese           Attitude: [] Positive  [] Hostile  [] Demanding  [] Guarded  [] Defensive         [x] Cooperative  []  Uncooperative      Behavior:  [] Normal Gait  [] Walks with Assistance  [] Dayanara Chair    [] Walks with Paddy Reynoldsickson  [x] In Hospital Bed  [] Sitting in Chair    Muscle-Skeletal:  [x] Normal Muscle Tone [] Muscle Atrophy       [] Abnormal Muscle Movement     Eye Contact:  [] Good eye contact  [x] Intermittent Eye Contact  [] Poor Eye Contact     Mood: [x] Depressed  [x] Anxious  [] Irritated  [] Euthymic   [] Angry [] Restless    Affect:  [] Congruent  [] Incongruent  [x] Labile  [] Constricted  [] Flat  [] Bizarre     Thought Process and Association:  [] Logical [] Illogical       [] Linear and Goal Directed  [x] Tangential  [] Circumstantial     Thought Content:  [] Denies [] Endorses [] Suicidal [] Homicidal  [x] Delusional      [x] Paranoid  [] Somatic  [] Grandiose    Perception: []  None  [x]

## 2019-03-26 LAB — METER GLUCOSE: 87 MG/DL (ref 74–99)

## 2019-03-26 PROCEDURE — 99231 SBSQ HOSP IP/OBS SF/LOW 25: CPT | Performed by: NURSE PRACTITIONER

## 2019-03-26 PROCEDURE — 1240000000 HC EMOTIONAL WELLNESS R&B

## 2019-03-26 PROCEDURE — 6370000000 HC RX 637 (ALT 250 FOR IP): Performed by: PSYCHIATRY & NEUROLOGY

## 2019-03-26 PROCEDURE — 6370000000 HC RX 637 (ALT 250 FOR IP): Performed by: NURSE PRACTITIONER

## 2019-03-26 PROCEDURE — 82962 GLUCOSE BLOOD TEST: CPT

## 2019-03-26 RX ADMIN — HALOPERIDOL 5 MG: 5 TABLET ORAL at 09:03

## 2019-03-26 RX ADMIN — HALOPERIDOL 5 MG: 5 TABLET ORAL at 21:59

## 2019-03-26 RX ADMIN — DESVENLAFAXINE SUCCINATE 50 MG: 50 TABLET, FILM COATED, EXTENDED RELEASE ORAL at 09:03

## 2019-03-26 RX ADMIN — DIPHENHYDRAMINE HCL 25 MG: 25 TABLET ORAL at 21:59

## 2019-03-26 RX ADMIN — DIPHENHYDRAMINE HCL 25 MG: 25 TABLET ORAL at 09:03

## 2019-03-26 RX ADMIN — TRAZODONE HYDROCHLORIDE 50 MG: 50 TABLET ORAL at 21:59

## 2019-03-26 RX ADMIN — LISINOPRIL 5 MG: 5 TABLET ORAL at 09:03

## 2019-03-26 RX ADMIN — METFORMIN HYDROCHLORIDE 500 MG: 500 TABLET ORAL at 09:03

## 2019-03-26 ASSESSMENT — PAIN SCALES - GENERAL
PAINLEVEL_OUTOF10: 0

## 2019-03-26 NOTE — PROGRESS NOTES
Hospitalist Progress Note      PCP: No primary care provider on file. Date of Admission: 3/24/2019    Chief Complaint: medical [facility]    Hospital Course:   25 y.o. female who presented to [facility] presented with SI. He was medically cleared in the ED and sent to inpatient psych. Prediabetes  And HTN noted in past. She has been noncompliant with meds for several months. The patient denies any fever, cough, chest pain, shortness of breath, nausea and vomiting. No dysuria. 3/25/19: BP stable. Daily BGL ordered. zofran for nausea. 3/26/19: BP stable. BGL stable. Continue current treatment    Subjective:   Lying in bed, states her nausea is better today. Medications:  Reviewed    Infusion Medications   Scheduled Medications    haloperidol  5 mg Oral BID    desvenlafaxine succinate  50 mg Oral Daily    diphenhydrAMINE  25 mg Oral BID    lisinopril  5 mg Oral Daily    metFORMIN  500 mg Oral Daily with breakfast     PRN Meds: ondansetron, acetaminophen, hydrOXYzine, haloperidol lactate, traZODone, benztropine mesylate, magnesium hydroxide, aluminum & magnesium hydroxide-simethicone    No intake or output data in the 24 hours ending 03/26/19 0922    Exam:    /76   Pulse 74   Temp 98.2 °F (36.8 °C) (Temporal)   Resp 12   Ht 5' 7\" (1.702 m)   Wt 220 lb (99.8 kg)   SpO2 98%   BMI 34.46 kg/m²     General appearance: No apparent distress, appears stated age and cooperative. HEENT: Pupils equal, round, and reactive to light. Conjunctivae/corneas clear. Neck: Supple, with full range of motion. No jugular venous distention. Trachea midline. Respiratory:  Normal respiratory effort. Cardiovascular: Regular rate and rhythm   Abdomen: Soft, non-tender, non-distended   Musculoskeletal: No clubbing, cyanosis or edema bilaterally. Full range of motion without deformity. Skin: Skin color, texture, turgor normal.  No rashes or lesions.   Neurologic:  Neurovascularly intact without any focal sensory/motor deficits. Psychiatric: Alert and oriented, thought content appropriate, normal insight  Capillary Refill: Brisk,< 3 seconds   Peripheral Pulses: +2 palpable, equal bilaterally       Labs:   Recent Labs     03/24/19 0230   WBC 11.8*   HGB 9.9*   HCT 33.8*   *     Recent Labs     03/24/19 0230      K 3.8      CO2 25   BUN 14   CREATININE 1.1*   CALCIUM 9.2     Recent Labs     03/24/19 0230   AST 15   ALT 13   BILITOT <0.2   ALKPHOS 93     No results for input(s): INR in the last 72 hours. No results for input(s): Eyvonne Ape in the last 72 hours. Assessment/Plan:    Active Hospital Problems    Diagnosis Date Noted    HTN (hypertension) [I10] 03/25/2019    Prediabetes [R73.03] 03/25/2019    Schizoaffective disorder, depressive type ([facility] [address] [F25.1] 03/24/2019     HTN  - monitor BP  - resume home medications    prediabetes  - continue home medications  - monitor BGL daily.     Obesity  - BMI 34.5  - lifestyle changes    Schizoaffective disorder  - management per primary team    DVT Prophylaxis: activity and ambulation  Diet: DIET CARB CONTROL;  Code Status: Full Code    PT/OT Eval Status: n/a    Dispo - per primary    She CHRISTINE Sierra - CNS

## 2019-03-26 NOTE — PROGRESS NOTES
Group Therapy Note    Date: 3/26/2019  Start Time: 11:00 AM  End Time: 11:45 AM  Number of Participants: 10    Type of Group: Psychotherapy    Wellness Binder Information  Module Name:  n/a  Session Number:  n/a    Patient's Goal: To increase socialization and improve interpersonal relationships. Notes: Pt was not engaged in group. Pt appeared to be asleep throughout group.      Status After Intervention:  Unchanged    Participation Level: None    Participation Quality: Lethargic      Speech:  normal      Thought Process/Content: Linear      Affective Functioning: Flat      Mood: depressed      Level of consciousness:  Inattentive      Response to Learning: Resistant      Endings: None Reported    Modes of Intervention: Support, Socialization and Exploration      Discipline Responsible: /Counselor      Signature:  Liudmila Rhodes

## 2019-03-26 NOTE — PROGRESS NOTES
Pt denies SI HI and hallucinations. Pt remains isolative to her room except for meals and groups. Pt is quiet and cooperative. Pt is flat and congruent. Pt is medication compliant. Pt ate her meals and attended a majority of the groups. No aggression. No falls. No elopement. We will continue to provide support and comfort for the patient.

## 2019-03-26 NOTE — PLAN OF CARE
Patient Alert and oriented. Denies SI,HI or hallucinations. Was isolative to her room. Was informed of relaxation and patient declined. Patient also declined to come out for evening snack, but did except her nourishment (milk & uriel crackers)will continue to monitor.  Provide needs and safe environment

## 2019-03-26 NOTE — PROGRESS NOTES
Group Therapy Note    Patient attended goals group and stated daily goal as to stay awake and go to groups.                                                                      Group Therapy Note     Date: 3/26/2019  Start Time: 10:00  End Time:  10:55  Number of Participants: 9     Type of Group: Psychoeducation     Wellness Binder Information  Module Name: Coping skills  Session Number:  9-1     Patient's Goal: To id positive coping skills to use on a daily basis.       Notes: Attended group and was able to participate minimally as she was falling asleep.       Status After Intervention:  Unchanged    Participation Level: Interactive and Minimal    Participation Quality: Lethargic      Speech:  hesitant      Thought Process/Content: Perseverating      Affective Functioning: Flat      Mood: anxious and depressed      Level of consciousness:  Drowsy      Response to Learning: Progressing to goal      Endings: None Reported    Modes of Intervention: Education      Discipline Responsible: Psychoeducational Specialist      Signature:  MILAGROS Meyers

## 2019-03-27 VITALS
TEMPERATURE: 98.3 F | DIASTOLIC BLOOD PRESSURE: 80 MMHG | OXYGEN SATURATION: 98 % | BODY MASS INDEX: 34.53 KG/M2 | SYSTOLIC BLOOD PRESSURE: 136 MMHG | HEART RATE: 83 BPM | RESPIRATION RATE: 18 BRPM | WEIGHT: 220 LBS | HEIGHT: 67 IN

## 2019-03-27 LAB — METER GLUCOSE: 87 MG/DL (ref 74–99)

## 2019-03-27 PROCEDURE — 6370000000 HC RX 637 (ALT 250 FOR IP): Performed by: NURSE PRACTITIONER

## 2019-03-27 PROCEDURE — 82962 GLUCOSE BLOOD TEST: CPT

## 2019-03-27 PROCEDURE — 99238 HOSP IP/OBS DSCHRG MGMT 30/<: CPT | Performed by: NURSE PRACTITIONER

## 2019-03-27 RX ORDER — LISINOPRIL 5 MG/1
5 TABLET ORAL DAILY
Qty: 30 TABLET | Refills: 3 | Status: SHIPPED | OUTPATIENT
Start: 2019-03-28

## 2019-03-27 RX ORDER — HALOPERIDOL 5 MG
5 TABLET ORAL 2 TIMES DAILY
Qty: 60 TABLET | Refills: 0 | Status: SHIPPED | OUTPATIENT
Start: 2019-03-27

## 2019-03-27 RX ORDER — OLANZAPINE 10 MG/1
10 INJECTION, POWDER, LYOPHILIZED, FOR SOLUTION INTRAMUSCULAR
Status: DISCONTINUED | OUTPATIENT
Start: 2019-03-27 | End: 2019-03-27

## 2019-03-27 RX ORDER — DESVENLAFAXINE 50 MG/1
50 TABLET, EXTENDED RELEASE ORAL DAILY
Qty: 30 TABLET | Refills: 0 | Status: SHIPPED | OUTPATIENT
Start: 2019-03-28

## 2019-03-27 RX ADMIN — DESVENLAFAXINE SUCCINATE 50 MG: 50 TABLET, FILM COATED, EXTENDED RELEASE ORAL at 09:15

## 2019-03-27 RX ADMIN — METFORMIN HYDROCHLORIDE 500 MG: 500 TABLET ORAL at 09:15

## 2019-03-27 RX ADMIN — LISINOPRIL 5 MG: 5 TABLET ORAL at 09:15

## 2019-03-27 RX ADMIN — DIPHENHYDRAMINE HCL 25 MG: 25 TABLET ORAL at 09:15

## 2019-03-27 RX ADMIN — HALOPERIDOL 5 MG: 5 TABLET ORAL at 09:15

## 2019-03-27 ASSESSMENT — PAIN SCALES - GENERAL
PAINLEVEL_OUTOF10: 0
PAINLEVEL_OUTOF10: 0

## 2019-03-27 NOTE — PLAN OF CARE
Problem: Falls - Risk of:  Goal: Will remain free from falls  Description  Will remain free from falls  3/27/2019 0157 by Aldean Harness  Outcome: Met This Shift    Problem: Suicide risk  Goal: Provide patient with safe environment  Description  Provide patient with safe environment  Outcome: Met This Shift     Pt resting in bed with eyes closed, no observed abnormalities. Close observations continue.

## 2019-03-27 NOTE — PROGRESS NOTES
[facility]  Discharge Note    Pt discharged with followings belongings:   Dentures: None  Vision - Corrective Lenses: None  Hearing Aid: None  Jewelry: None  Body Piercings Removed: N/A  Clothing: Footwear, Jacket / coat, Pants, Shirt, Socks  Were All Patient Medications Collected?: Not Applicable  Other Valuables: Cell phone, Lidia Angelia, Other (Comment)(purse)   Valuables sent home with client . No valuables retrieved from safe ., Patient left department with Departure Mode: Family member via Mobility at Departure: Ambulatory, discharged to Discharged to: Private Residence. Patient education on aftercare instructions:by Tete LESLIE and signed by client and Fariha Kyle. Stated was reviewed by Fariha Kyle. Patient verbalize understanding of AVS: appointments and next time for medications. To call unit if any further questions. Also informed of crisis hotline number if depressed or suicidal to call. Denies suicidal ideations or hallucinations or homocidal ideations .    Status EXAM upon discharge:  Status and Exam  Normal: No  Facial Expression: Flat  Affect: Blunt  Level of Consciousness: Alert  Mood:Normal: No  Mood: Suspicious, Depressed  Motor Activity:Normal: No  Motor Activity: Decreased  Interview Behavior: Cooperative  Preception: [place] to Person, Carlotta Chough to Time, [place] to Place, [place] to Situation  Attention:Normal: Yes  Thought Processes: Circumstantial  Thought Content:Normal: No  Thought Content: Poverty of Content  Hallucinations: None  Delusions: No  Delusions: (none expressed )  Memory:Normal: No  Memory: Poor Remote  Insight and Judgment: No  Insight and Judgment: Poor Insight, Poor Judgment  Present Suicidal Ideation: No  Present Homicidal Ideation: No    Nick Hoffman RN

## 2019-03-27 NOTE — DISCHARGE SUMMARY
Physician Discharge Summary      Physical Examination   VS/Measurements:     /69   Pulse 86   Temp 98.9 °F (37.2 °C) (Oral)   Resp 18   Ht 5' 7\" (1.702 m)   Wt 220 lb (99.8 kg)   SpO2 98%   BMI 34.46 kg/m²         Discharge Medications:              Bartolome Coleman   Home Medication Instructions [id number]    Printed on:03/27/19 1105   Medication Information                      desvenlafaxine succinate (PRISTIQ) 50 MG TB24 extended release tablet  Take 1 tablet by mouth daily             haloperidol (HALDOL) 5 MG tablet  Take 1 tablet by mouth 2 times daily                     Psychiatric: Mental Status Examination:    Cognition:      [x] Alert  [x] Awake  [x] Oriented  [x] Person  [x] Place [x] Time    [] drowsy  [] tired  [] lethargic  [] distractable       Attention/Concentration:   [x] Attentive  [] Distracted        Memory Recent and Remote: [x] Intact   [] Impaired [] Partially Impaired     Language: [] Able to recognize and name objects          [] Unable to recognize and name Objects    Fund of Knowledge:  [] Poor []  Fair  [] Good    Speech: [x] Normal  [] Soft  [] Slow  [] Fast [] Pressured            [] Loud [] Dysarthria  [] Incoherent       Appearance: [] Well Groomed  [x] Casual Dressed  [] Unkept  [] Disheveled          [] Normal weight[] Thin  [] Overweight  [] Obese           Attitude: [] Positive  [] Hostile  [] Demanding  [] Guarded  [] Defensive         [x] Cooperative  []  Uncooperative      Behavior:  [x] Normal Gait  [] Walks with Assistance  [] Dayanara Chair    [] Walks with Shirley Rater  [] In Hospital Bed  [] Sitting in Chair    Muscle-Skeletal:  [x] Normal Muscle Tone [] Muscle Atrophy       [] Abnormal Muscle Movement     Eye Contact:  [x] Good eye contact  [] Intermittent Eye Contact  [] Poor Eye Contact     Mood: [] Depressed  [] Anxious  [] Irritated  [x] Euthymic   [] Angry [] Restless    Affect:  [x] Congruent  [] Incongruent  [] Labile  [] Constricted  [] Flat  [] Bizarre     Thought Process and Association:  [] Logical [] Illogical       [x] Linear and Goal Directed  [] Tangential  [] Circumstantial     Thought Content:  [x] Denies [] Endorses [] Suicidal [] Homicidal  [] Delusional      [] Paranoid  [] Somatic  [] Grandiose    Perception: [x]  None  [] Auditory   [] Visual  [] tactile   [] olfactory  [] Illusions         Insight: [] Intact  [x] Fair  [] Limited    Judgement:  [] Intact  [x] Fair  [] Limited    Hospital Course:   Admit Date: 3/24/2019     Discharge Date: 3/27/2019  Admitted from:  [x]  Emergency Room  []  Home  []  Another facility   []  [facility]     Admitting diagnosis:   Patient Active Problem List   Diagnosis    [facility]ffect[facility] disorder[facility] depressive type ([facility]    Severe episode of recurrent major depressive disorder, without psychotic features ([facility]    HTN (hypertension)    Type 2 diabetes mellitus without complication, without long-term current use of insulin ([facility]    Prediabetes      Length of stay:  3 days              Radha Tracy was admitted in Psychiatric unit  from ER with depression and SI. Patient was treated            With the above . Patient responded well to the treatment. Discharge Summary Plan:     Discharge Status:    [x] Improved [] Unchanged    [] Worse       Discharge instructions given:  [x] Patient    [] Family [] Other         Discharge disposition:  [x] Home [] Step Down unit  [] Group Home []  [facility]                                                    [] [facility]    [] AMA  [] Other           Prescriptions: Continue same medications, review with patient.        Reason for more than one antipsychotic:  [x] N/A  [] 3 failed monotherapy(drugs tried):  [] Cross over to a new antipsychotic  [] Taper to monotherapy from polypharmacy  [] Augmentation of Clozapine therapy due to treatment resistance to single therapy      Diagnosis:        Patient Active Problem List   Diagnosis Code    Schizoaffective disorder, depressive type ([facility] F25.1    Severe episode of recurrent major depressive disorder, without psychotic features ([facility] F33.2    HTN (hypertension) I10    Type 2 diabetes mellitus without complication, without long-term current use of insulin ([facility] E11.9    Prediabetes R73.03       Education and Follow-up:  Counseled:  [x] Patient     [] Family    [] Guardian      Signed:   Yamini Ferrera   3/27/2019   11:02 AM

## 2019-03-27 NOTE — PROGRESS NOTES
Pt denies SI, HI, and hallucinations. Pt is pleasant, cooperative. Pt is distractible. Pt verbalized readiness for discharge. AVS reviewed with pt, pt verbalized understanding. Pt is calling around for a ride. Pt states she cannot pay for medications right now and requested prescriptions be sent to [facility]. Outpt pharmacy notified. Follow up appointment made with Dr. Brandie Ozuna.

## 2019-03-27 NOTE — CARE COORDINATION
In order to ensure appropriate transition and discharge planning is in place, the following documents have been transmitted to [employer], as the new outpatient provider:     The d/c diagnosis was transmitted to the next care provider   The reason for hospitalization was transmitted to the next care provider   The d/c medications (dosage and indication) were transmitted to the next care provider    The continuing care plan was transmitted to the next care provider

## 2019-03-27 NOTE — PROGRESS NOTES
Attended and participated in goals group. Identified goal for the day as: \"To stay woke\"                                                                         Group Therapy Note    Date: 3/27/2019  Start Time: 10:15  End Time:  10:50  Number of Participants: 6    Type of Group: Psychoeducation    Wellness Binder Information  Module Name:  Wellness Trivia: Popsicle sticks   Session Number:  n/a    Patient's Goal:  Patient will initiate conversations with peers across group and answer general wellness questions. Notes:  Patient attended and participated in wellness trivia. Patient gave support and feedback to others, sharing how to maintain wellness upon discharge from hospital.     Status After Intervention:  Improved    Participation Level:  Active Listener and Interactive    Participation Quality: Appropriate, Attentive, Sharing and Supportive      Speech:  normal      Thought Process/Content: Logical      Affective Functioning: Congruent      Mood: euthymic      Level of consciousness:  Alert, Oriented x4 and Attentive      Response to Learning: Able to verbalize current knowledge/experience, Able to verbalize/acknowledge new learning, Able to retain information, Capable of insight, Able to change behavior and Progressing to goal      Endings: None Reported    Modes of Intervention: Education, Support, Socialization, Exploration, Clarifying, Problem-solving and Activity      Discipline Responsible: Psychoeducational Specialist      Signature:  Crystal Hardwick

## 2019-07-31 ENCOUNTER — HOSPITAL ENCOUNTER (EMERGENCY)
Age: 29
Discharge: HOME | End: 2019-07-31
Payer: MEDICARE

## 2019-07-31 VITALS
HEIGHT: 67 IN | HEART RATE: 80 BPM | OXYGEN SATURATION: 100 % | TEMPERATURE: 98 F | DIASTOLIC BLOOD PRESSURE: 85 MMHG | RESPIRATION RATE: 14 BRPM | BODY MASS INDEX: 34.53 KG/M2 | SYSTOLIC BLOOD PRESSURE: 126 MMHG | WEIGHT: 220 LBS

## 2019-07-31 DIAGNOSIS — T63.441A BEE STING, ACCIDENTAL OR UNINTENTIONAL, INITIAL ENCOUNTER: Primary | ICD-10-CM

## 2019-07-31 LAB
HCG, URINE, POC: NEGATIVE
Lab: NORMAL
NEGATIVE QC PASS/FAIL: NORMAL
POSITIVE QC PASS/FAIL: NORMAL

## 2019-07-31 PROCEDURE — 6370000000 HC RX 637 (ALT 250 FOR IP): Performed by: NURSE PRACTITIONER

## 2019-07-31 PROCEDURE — 99282 EMERGENCY DEPT VISIT SF MDM: CPT

## 2019-07-31 RX ORDER — EPINEPHRINE 0.3 MG/.3ML
0.3 INJECTION SUBCUTANEOUS
Qty: 1 EACH | Refills: 0 | Status: SHIPPED | OUTPATIENT
Start: 2019-07-31 | End: 2019-07-31

## 2019-07-31 RX ORDER — DIPHENHYDRAMINE HCL 25 MG
25 TABLET ORAL ONCE
Status: DISCONTINUED | OUTPATIENT
Start: 2019-07-31 | End: 2019-07-31

## 2019-07-31 RX ORDER — FAMOTIDINE 20 MG/1
20 TABLET, FILM COATED ORAL 2 TIMES DAILY
Qty: 14 TABLET | Refills: 0 | Status: SHIPPED | OUTPATIENT
Start: 2019-07-31 | End: 2019-08-07

## 2019-07-31 RX ORDER — LORATADINE 10 MG/1
10 TABLET ORAL DAILY
Qty: 10 TABLET | Refills: 0 | Status: SHIPPED | OUTPATIENT
Start: 2019-07-31

## 2019-07-31 RX ORDER — PREDNISONE 20 MG/1
60 TABLET ORAL ONCE
Status: COMPLETED | OUTPATIENT
Start: 2019-07-31 | End: 2019-07-31

## 2019-07-31 RX ORDER — PREDNISONE 10 MG/1
TABLET ORAL
Qty: 20 TABLET | Refills: 0 | Status: SHIPPED | OUTPATIENT
Start: 2019-07-31 | End: 2019-08-10

## 2019-07-31 RX ORDER — FAMOTIDINE 20 MG/1
20 TABLET, FILM COATED ORAL ONCE
Status: COMPLETED | OUTPATIENT
Start: 2019-07-31 | End: 2019-07-31

## 2019-07-31 RX ADMIN — PREDNISONE 60 MG: 20 TABLET ORAL at 21:55

## 2019-07-31 RX ADMIN — FAMOTIDINE 20 MG: 20 TABLET ORAL at 21:55

## 2019-09-10 NOTE — PLAN OF CARE
Patient resting quiet to self at this time, respirations are even and unlabored, no signs or symptoms of distress or discomfort. No PRN medications given thus far this shift. Staff will continue to conduct environmental rounds to ensure the safety of everyone on the unit. Staff will provide support and interventions as requested or required. normal... Well appearing, well nourished, awake, alert, oriented to person, mild to moderate agitation but responding to redirection. ALOOB.

## 2019-09-28 ENCOUNTER — APPOINTMENT (OUTPATIENT)
Dept: CT IMAGING | Age: 29
End: 2019-09-28
Payer: MEDICARE

## 2019-09-28 ENCOUNTER — HOSPITAL ENCOUNTER (EMERGENCY)
Age: 29
Discharge: HOME | End: 2019-09-28
Payer: MEDICARE

## 2019-09-28 VITALS
RESPIRATION RATE: 14 BRPM | BODY MASS INDEX: 34.53 KG/M2 | OXYGEN SATURATION: 97 % | SYSTOLIC BLOOD PRESSURE: 104 MMHG | WEIGHT: 220 LBS | HEIGHT: 67 IN | DIASTOLIC BLOOD PRESSURE: 57 MMHG | HEART RATE: 70 BPM | TEMPERATURE: 97.9 F

## 2019-09-28 DIAGNOSIS — G40.919 BREAKTHROUGH SEIZURE (HCC): Primary | ICD-10-CM

## 2019-09-28 LAB
ACETAMINOPHEN LEVEL: <5 MCG/ML (ref 10–30)
AMPHETAMINE SCREEN, URINE: NOT DETECTED
ANION GAP SERPL CALCULATED.3IONS-SCNC: 14 MMOL/L (ref 7–16)
ANISOCYTOSIS: ABNORMAL
BACTERIA: ABNORMAL /HPF
BARBITURATE SCREEN URINE: NOT DETECTED
BASOPHILS ABSOLUTE: 0.04 E9/L (ref 0–0.2)
BASOPHILS RELATIVE PERCENT: 0.4 % (ref 0–2)
BENZODIAZEPINE SCREEN, URINE: NOT DETECTED
BILIRUBIN URINE: NEGATIVE
BLOOD, URINE: ABNORMAL
BUN BLDV-MCNC: 19 MG/DL (ref 6–20)
BURR CELLS: ABNORMAL
CALCIUM SERPL-MCNC: 9.4 MG/DL (ref 8.6–10.2)
CANNABINOID SCREEN URINE: NOT DETECTED
CHLORIDE BLD-SCNC: 105 MMOL/L (ref 98–107)
CLARITY: CLEAR
CO2: 21 MMOL/L (ref 22–29)
COCAINE METABOLITE SCREEN URINE: NOT DETECTED
COLOR: YELLOW
CREAT SERPL-MCNC: 1.1 MG/DL (ref 0.5–1)
EOSINOPHILS ABSOLUTE: 0.04 E9/L (ref 0.05–0.5)
EOSINOPHILS RELATIVE PERCENT: 0.4 % (ref 0–6)
ETHANOL: <10 MG/DL (ref 0–0.08)
GFR AFRICAN AMERICAN: >60
GFR NON-AFRICAN AMERICAN: >60 ML/MIN/1.73
GLUCOSE BLD-MCNC: 135 MG/DL (ref 74–99)
GLUCOSE URINE: NEGATIVE MG/DL
HCG, URINE, POC: NEGATIVE
HCT VFR BLD CALC: 34.9 % (ref 34–48)
HEMOGLOBIN: 9.8 G/DL (ref 11.5–15.5)
HYPOCHROMIA: ABNORMAL
IMMATURE GRANULOCYTES #: 0.02 E9/L
IMMATURE GRANULOCYTES %: 0.2 % (ref 0–5)
KETONES, URINE: NEGATIVE MG/DL
LEUKOCYTE ESTERASE, URINE: NEGATIVE
LYMPHOCYTES ABSOLUTE: 2.27 E9/L (ref 1.5–4)
LYMPHOCYTES RELATIVE PERCENT: 25.3 % (ref 20–42)
Lab: NORMAL
Lab: NORMAL
MCH RBC QN AUTO: 19 PG (ref 26–35)
MCHC RBC AUTO-ENTMCNC: 28.1 % (ref 32–34.5)
MCV RBC AUTO: 67.5 FL (ref 80–99.9)
METHADONE SCREEN, URINE: NOT DETECTED
MONOCYTES ABSOLUTE: 0.6 E9/L (ref 0.1–0.95)
MONOCYTES RELATIVE PERCENT: 6.7 % (ref 2–12)
NEGATIVE QC PASS/FAIL: NORMAL
NEUTROPHILS ABSOLUTE: 6.02 E9/L (ref 1.8–7.3)
NEUTROPHILS RELATIVE PERCENT: 67 % (ref 43–80)
NITRITE, URINE: NEGATIVE
OPIATE SCREEN URINE: NOT DETECTED
OVALOCYTES: ABNORMAL
PDW BLD-RTO: 20.9 FL (ref 11.5–15)
PH UA: 5.5 (ref 5–9)
PHENCYCLIDINE SCREEN URINE: NOT DETECTED
PLATELET # BLD: 438 E9/L (ref 130–450)
PMV BLD AUTO: 9.8 FL (ref 7–12)
POIKILOCYTES: ABNORMAL
POLYCHROMASIA: ABNORMAL
POSITIVE QC PASS/FAIL: NORMAL
POTASSIUM SERPL-SCNC: 4 MMOL/L (ref 3.5–5)
PROPOXYPHENE SCREEN: NOT DETECTED
PROTEIN UA: 30 MG/DL
RBC # BLD: 5.17 E12/L (ref 3.5–5.5)
RBC UA: >20 /HPF (ref 0–2)
SALICYLATE, SERUM: <0.3 MG/DL (ref 0–30)
SCHISTOCYTES: ABNORMAL
SODIUM BLD-SCNC: 140 MMOL/L (ref 132–146)
SPECIFIC GRAVITY UA: >=1.03 (ref 1–1.03)
TEAR DROP CELLS: ABNORMAL
TRICHOMONAS: ABNORMAL /HPF
TRICYCLIC ANTIDEPRESSANTS SCREEN SERUM: NEGATIVE NG/ML
UROBILINOGEN, URINE: 0.2 E.U./DL
WBC # BLD: 9 E9/L (ref 4.5–11.5)
WBC UA: ABNORMAL /HPF (ref 0–5)

## 2019-09-28 PROCEDURE — 80048 BASIC METABOLIC PNL TOTAL CA: CPT

## 2019-09-28 PROCEDURE — 99285 EMERGENCY DEPT VISIT HI MDM: CPT

## 2019-09-28 PROCEDURE — 85025 COMPLETE CBC W/AUTO DIFF WBC: CPT

## 2019-09-28 PROCEDURE — 6360000002 HC RX W HCPCS: Performed by: PHYSICIAN ASSISTANT

## 2019-09-28 PROCEDURE — 96365 THER/PROPH/DIAG IV INF INIT: CPT

## 2019-09-28 PROCEDURE — 70450 CT HEAD/BRAIN W/O DYE: CPT

## 2019-09-28 PROCEDURE — G0480 DRUG TEST DEF 1-7 CLASSES: HCPCS

## 2019-09-28 PROCEDURE — 81001 URINALYSIS AUTO W/SCOPE: CPT

## 2019-09-28 PROCEDURE — 6370000000 HC RX 637 (ALT 250 FOR IP): Performed by: PHYSICIAN ASSISTANT

## 2019-09-28 PROCEDURE — 80307 DRUG TEST PRSMV CHEM ANLYZR: CPT

## 2019-09-28 PROCEDURE — 2580000003 HC RX 258: Performed by: PHYSICIAN ASSISTANT

## 2019-09-28 RX ORDER — 0.9 % SODIUM CHLORIDE 0.9 %
1000 INTRAVENOUS SOLUTION INTRAVENOUS ONCE
Status: COMPLETED | OUTPATIENT
Start: 2019-09-28 | End: 2019-09-28

## 2019-09-28 RX ORDER — ACETAMINOPHEN 500 MG
1000 TABLET ORAL ONCE
Status: COMPLETED | OUTPATIENT
Start: 2019-09-28 | End: 2019-09-28

## 2019-09-28 RX ORDER — LEVETIRACETAM 500 MG/1
1000 TABLET ORAL 2 TIMES DAILY
Qty: 60 TABLET | Refills: 0 | Status: SHIPPED | OUTPATIENT
Start: 2019-09-28 | End: 2019-10-13

## 2019-09-28 RX ORDER — LEVETIRACETAM 10 MG/ML
1000 INJECTION INTRAVASCULAR ONCE
Status: COMPLETED | OUTPATIENT
Start: 2019-09-28 | End: 2019-09-28

## 2019-09-28 RX ADMIN — LEVETIRACETAM 1000 MG: 10 INJECTION INTRAVENOUS at 02:11

## 2019-09-28 RX ADMIN — SODIUM CHLORIDE 1000 ML: 9 INJECTION, SOLUTION INTRAVENOUS at 02:11

## 2019-09-28 RX ADMIN — ACETAMINOPHEN 1000 MG: 500 TABLET ORAL at 02:11

## 2019-09-28 ASSESSMENT — PAIN SCALES - GENERAL
PAINLEVEL_OUTOF10: 7
PAINLEVEL_OUTOF10: 7

## 2019-09-28 ASSESSMENT — PAIN DESCRIPTION - PAIN TYPE: TYPE: ACUTE PAIN

## 2019-09-28 NOTE — ED PROVIDER NOTES
denies any likelihood that she may be pregnant. She reports that she has been having her current menstrual cycle over the last month, which is normal for her. ROS    Pertinent positives and negatives are stated within HPI, all other systems reviewed and are negative. History reviewed. No pertinent surgical history. Social History:  has an unknown smoking status. She has never used smokeless tobacco. She reports that she drank alcohol. She reports that she has current or past drug history. Family History: family history is not on file. Allergies: Nuts [peanut-containing drug products]    Physical Exam         ED Triage Vitals [09/28/19 0151]   BP Temp Temp Source Pulse Resp SpO2 Height Weight   (!) 157/75 97.9 °F (36.6 °C) Oral 86 16 99 % 5' 7\" (1.702 m) 220 lb (99.8 kg)      Oxygen Saturation Interpretation: Normal.    Constitutional:  Alert, development consistent with age. HEENT:  NC/NT. Airway patent. Neck:  Normal ROM. Supple. Respiratory:  Clear to auscultation and breath sounds equal.  CV:  Regular rate and rhythm, normal heart sounds, without pathological murmurs, ectopy, gallops, or rubs. GI:  Abdomen Soft, nontender, good bowel sounds. No firm or pulsatile mass. Extremities: No tenderness or edema noted. Integument:  Normal turgor. Warm, dry, without visible rash, unless noted elsewhere. Neurological:  Oriented x 3. GCS 15. Motor functions intact.      Lab / Imaging Results   (All laboratory and radiology results have been personally reviewed by myself)  Labs:  Results for orders placed or performed during the hospital encounter of 09/28/19   CBC Auto Differential   Result Value Ref Range    WBC 9.0 4.5 - 11.5 E9/L    RBC 5.17 3.50 - 5.50 E12/L    Hemoglobin 9.8 (L) 11.5 - 15.5 g/dL    Hematocrit 34.9 34.0 - 48.0 %    MCV 67.5 (L) 80.0 - 99.9 fL    MCH 19.0 (L) 26.0 - 35.0 pg    MCHC 28.1 (L) 32.0 - 34.5 %    RDW 20.9 (H) 11.5 - 15.0 fL    Platelets 050 704 - 129 E9/L    MPV 9.8 7.0 - 12.0 fL    Neutrophils % 67.0 43.0 - 80.0 %    Immature Granulocytes % 0.2 0.0 - 5.0 %    Lymphocytes % 25.3 20.0 - 42.0 %    Monocytes % 6.7 2.0 - 12.0 %    Eosinophils % 0.4 0.0 - 6.0 %    Basophils % 0.4 0.0 - 2.0 %    Neutrophils Absolute 6.02 1.80 - 7.30 E9/L    Immature Granulocytes # 0.02 E9/L    Lymphocytes Absolute 2.27 1.50 - 4.00 E9/L    Monocytes Absolute 0.60 0.10 - 0.95 E9/L    Eosinophils Absolute 0.04 (L) 0.05 - 0.50 E9/L    Basophils Absolute 0.04 0.00 - 0.20 E9/L    Anisocytosis 2+     Polychromasia 1+     Hypochromia 1+     Poikilocytes 2+     Schistocytes 1+     [place] Cells 1+     Ovalocytes 2+     Tear Drop Cells 1+    Basic Metabolic Panel   Result Value Ref Range    Sodium 140 132 - 146 mmol/L    Potassium 4.0 3.5 - 5.0 mmol/L    Chloride 105 98 - 107 mmol/L    CO2 21 (L) 22 - 29 mmol/L    Anion Gap 14 7 - 16 mmol/L    Glucose 135 (H) 74 - 99 mg/dL    BUN 19 6 - 20 mg/dL    CREATININE 1.1 (H) 0.5 - 1.0 mg/dL    GFR Non-African American >60 >=60 mL/min/1.73    GFR African American >60     Calcium 9.4 8.6 - 10.2 mg/dL   Urinalysis   Result Value Ref Range    Color, UA Yellow Straw/Yellow    Clarity, UA Clear Clear    Glucose, Ur Negative Negative mg/dL    Bilirubin Urine Negative Negative    Ketones, Urine Negative Negative mg/dL    Specific Gravity, UA >=1.030 1.005 - 1.030    Blood, Urine LARGE (A) Negative    pH, UA 5.5 5.0 - 9.0    Protein, UA 30 (A) Negative mg/dL    Urobilinogen, Urine 0.2 <2.0 E.U./dL    Nitrite, Urine Negative Negative    Leukocyte Esterase, Urine Negative Negative   Urine Drug Screen   Result Value Ref Range    Amphetamine Screen, Urine NOT DETECTED Negative <1000 ng/mL    Barbiturate Screen, Ur NOT DETECTED Negative < 200 ng/mL    Benzodiazepine Screen, Urine NOT DETECTED Negative < 200 ng/mL    Cannabinoid Scrn, Ur NOT DETECTED Negative < 50ng/mL    Cocaine Metabolite Screen, Urine NOT DETECTED Negative < 300 ng/mL    Opiate Scrn, Ur NOT DETECTED Negative < 300ng/mL

## 2020-01-08 ENCOUNTER — HOSPITAL ENCOUNTER (EMERGENCY)
Age: 30
Discharge: PSYCHIATRIC OR SUBSTANCE USE FACILITY | End: 2020-01-08
Attending: EMERGENCY MEDICINE
Payer: MEDICARE

## 2020-01-08 VITALS
WEIGHT: 220 LBS | DIASTOLIC BLOOD PRESSURE: 70 MMHG | HEIGHT: 62 IN | OXYGEN SATURATION: 100 % | HEART RATE: 84 BPM | TEMPERATURE: 98.5 F | SYSTOLIC BLOOD PRESSURE: 130 MMHG | RESPIRATION RATE: 18 BRPM | BODY MASS INDEX: 40.48 KG/M2

## 2020-01-08 LAB
ACETAMINOPHEN LEVEL: <5 MCG/ML (ref 10–30)
ALBUMIN SERPL-MCNC: 4.2 G/DL (ref 3.5–5.2)
ALP BLD-CCNC: 102 U/L (ref 35–104)
ALT SERPL-CCNC: 14 U/L (ref 0–32)
AMPHETAMINE SCREEN, URINE: NOT DETECTED
ANION GAP SERPL CALCULATED.3IONS-SCNC: 12 MMOL/L (ref 7–16)
ANISOCYTOSIS: ABNORMAL
AST SERPL-CCNC: 18 U/L (ref 0–31)
BACTERIA: ABNORMAL /HPF
BARBITURATE SCREEN URINE: NOT DETECTED
BASOPHILS ABSOLUTE: 0.03 E9/L (ref 0–0.2)
BASOPHILS RELATIVE PERCENT: 0.4 % (ref 0–2)
BENZODIAZEPINE SCREEN, URINE: NOT DETECTED
BILIRUB SERPL-MCNC: <0.2 MG/DL (ref 0–1.2)
BILIRUBIN URINE: NEGATIVE
BLOOD, URINE: ABNORMAL
BUN BLDV-MCNC: 12 MG/DL (ref 6–20)
BURR CELLS: ABNORMAL
CALCIUM SERPL-MCNC: 9.6 MG/DL (ref 8.6–10.2)
CANNABINOID SCREEN URINE: NOT DETECTED
CHLORIDE BLD-SCNC: 106 MMOL/L (ref 98–107)
CLARITY: ABNORMAL
CO2: 25 MMOL/L (ref 22–29)
COCAINE METABOLITE SCREEN URINE: NOT DETECTED
COLOR: ABNORMAL
CREAT SERPL-MCNC: 0.9 MG/DL (ref 0.5–1)
EOSINOPHILS ABSOLUTE: 0.05 E9/L (ref 0.05–0.5)
EOSINOPHILS RELATIVE PERCENT: 0.6 % (ref 0–6)
ETHANOL: <10 MG/DL (ref 0–0.08)
FENTANYL SCREEN, URINE: NOT DETECTED
GFR AFRICAN AMERICAN: >60
GFR NON-AFRICAN AMERICAN: >60 ML/MIN/1.73
GLUCOSE BLD-MCNC: 110 MG/DL (ref 74–99)
GLUCOSE URINE: NEGATIVE MG/DL
HCG(URINE) PREGNANCY TEST: NEGATIVE
HCT VFR BLD CALC: 30.8 % (ref 34–48)
HEMOGLOBIN: 8.6 G/DL (ref 11.5–15.5)
HYPOCHROMIA: ABNORMAL
IMMATURE GRANULOCYTES #: 0.03 E9/L
IMMATURE GRANULOCYTES %: 0.4 % (ref 0–5)
KETONES, URINE: ABNORMAL MG/DL
LEUKOCYTE ESTERASE, URINE: ABNORMAL
LYMPHOCYTES ABSOLUTE: 2.79 E9/L (ref 1.5–4)
LYMPHOCYTES RELATIVE PERCENT: 34.1 % (ref 20–42)
Lab: NORMAL
MCH RBC QN AUTO: 19.7 PG (ref 26–35)
MCHC RBC AUTO-ENTMCNC: 27.9 % (ref 32–34.5)
MCV RBC AUTO: 70.5 FL (ref 80–99.9)
METHADONE SCREEN, URINE: NOT DETECTED
MONOCYTES ABSOLUTE: 0.42 E9/L (ref 0.1–0.95)
MONOCYTES RELATIVE PERCENT: 5.1 % (ref 2–12)
NEUTROPHILS ABSOLUTE: 4.85 E9/L (ref 1.8–7.3)
NEUTROPHILS RELATIVE PERCENT: 59.4 % (ref 43–80)
NITRITE, URINE: POSITIVE
OPIATE SCREEN URINE: NOT DETECTED
OVALOCYTES: ABNORMAL
OXYCODONE URINE: NOT DETECTED
PDW BLD-RTO: 18.5 FL (ref 11.5–15)
PH UA: 5 (ref 5–9)
PHENCYCLIDINE SCREEN URINE: NOT DETECTED
PLATELET # BLD: 496 E9/L (ref 130–450)
PMV BLD AUTO: 9.6 FL (ref 7–12)
POIKILOCYTES: ABNORMAL
POLYCHROMASIA: ABNORMAL
POTASSIUM REFLEX MAGNESIUM: 4 MMOL/L (ref 3.5–5)
PROTEIN UA: >=300 MG/DL
RBC # BLD: 4.37 E12/L (ref 3.5–5.5)
RBC UA: ABNORMAL /HPF (ref 0–2)
SALICYLATE, SERUM: <0.3 MG/DL (ref 0–30)
SODIUM BLD-SCNC: 143 MMOL/L (ref 132–146)
SPECIFIC GRAVITY UA: >=1.03 (ref 1–1.03)
T4 TOTAL: 7.5 MCG/DL (ref 4.5–11.7)
TARGET CELLS: ABNORMAL
TOTAL PROTEIN: 8.1 G/DL (ref 6.4–8.3)
TRICYCLIC ANTIDEPRESSANTS SCREEN SERUM: NEGATIVE NG/ML
TSH SERPL DL<=0.05 MIU/L-ACNC: 4.03 UIU/ML (ref 0.27–4.2)
UROBILINOGEN, URINE: 2 E.U./DL
WBC # BLD: 8.2 E9/L (ref 4.5–11.5)
WBC UA: ABNORMAL /HPF (ref 0–5)

## 2020-01-08 PROCEDURE — 80307 DRUG TEST PRSMV CHEM ANLYZR: CPT

## 2020-01-08 PROCEDURE — 84436 ASSAY OF TOTAL THYROXINE: CPT

## 2020-01-08 PROCEDURE — G0480 DRUG TEST DEF 1-7 CLASSES: HCPCS

## 2020-01-08 PROCEDURE — 85025 COMPLETE CBC W/AUTO DIFF WBC: CPT

## 2020-01-08 PROCEDURE — 81025 URINE PREGNANCY TEST: CPT

## 2020-01-08 PROCEDURE — 36415 COLL VENOUS BLD VENIPUNCTURE: CPT

## 2020-01-08 PROCEDURE — 99285 EMERGENCY DEPT VISIT HI MDM: CPT

## 2020-01-08 PROCEDURE — 80053 COMPREHEN METABOLIC PANEL: CPT

## 2020-01-08 PROCEDURE — 81001 URINALYSIS AUTO W/SCOPE: CPT

## 2020-01-08 PROCEDURE — 84443 ASSAY THYROID STIM HORMONE: CPT

## 2020-01-08 ASSESSMENT — PATIENT HEALTH QUESTIONNAIRE - PHQ9: SUM OF ALL RESPONSES TO PHQ QUESTIONS 1-9: 7

## 2020-01-08 NOTE — ED PROVIDER NOTES
rigidity. Musculoskeletal: Moves all extremities x 4. Warm and well perfused, no clubbing, cyanosis, or edema. Capillary refill <3 seconds  Integument: skin warm and dry. No rashes. Lymphatic: no lymphadenopathy noted  Neurologic: GCS 15, no focal deficits, symmetric strength 5/5 in the upper and lower extremities bilaterally  Psychiatric: flat affect    -------------------------------------------------- RESULTS -------------------------------------------------  I have personally reviewed all laboratory and imaging results for this patient. Results are listed below.      LABS:  Results for orders placed or performed during the hospital encounter of 01/08/20   CBC Auto Differential   Result Value Ref Range    WBC 8.2 4.5 - 11.5 E9/L    RBC 4.37 3.50 - 5.50 E12/L    Hemoglobin 8.6 (L) 11.5 - 15.5 g/dL    Hematocrit 30.8 (L) 34.0 - 48.0 %    MCV 70.5 (L) 80.0 - 99.9 fL    MCH 19.7 (L) 26.0 - 35.0 pg    MCHC 27.9 (L) 32.0 - 34.5 %    RDW 18.5 (H) 11.5 - 15.0 fL    Platelets 922 (H) 583 - 450 E9/L    MPV 9.6 7.0 - 12.0 fL    Neutrophils % 59.4 43.0 - 80.0 %    Immature Granulocytes % 0.4 0.0 - 5.0 %    Lymphocytes % 34.1 20.0 - 42.0 %    Monocytes % 5.1 2.0 - 12.0 %    Eosinophils % 0.6 0.0 - 6.0 %    Basophils % 0.4 0.0 - 2.0 %    Neutrophils Absolute 4.85 1.80 - 7.30 E9/L    Immature Granulocytes # 0.03 E9/L    Lymphocytes Absolute 2.79 1.50 - 4.00 E9/L    Monocytes Absolute 0.42 0.10 - 0.95 E9/L    Eosinophils Absolute 0.05 0.05 - 0.50 E9/L    Basophils Absolute 0.03 0.00 - 0.20 E9/L    Anisocytosis 2+     Polychromasia 1+     Hypochromia 2+     Poikilocytes 1+     [place] Cells 1+     Ovalocytes 1+     Target Cells 1+    Comprehensive Metabolic Panel w/ Reflex to MG   Result Value Ref Range    Sodium 143 132 - 146 mmol/L    Potassium reflex Magnesium 4.0 3.5 - 5.0 mmol/L    Chloride 106 98 - 107 mmol/L    CO2 25 22 - 29 mmol/L    Anion Gap 12 7 - 16 mmol/L    Glucose 110 (H) 74 - 99 mg/dL    BUN 12 6 - 20 mg/dL CREATININE 0.9 0.5 - 1.0 mg/dL    GFR Non-African American >60 >=60 mL/min/1.73    GFR African American >60     Calcium 9.6 8.6 - 10.2 mg/dL    Total Protein 8.1 6.4 - 8.3 g/dL    Alb 4.2 3.5 - 5.2 g/dL    Total Bilirubin <0.2 0.0 - 1.2 mg/dL    Alkaline Phosphatase 102 35 - 104 U/L    ALT 14 0 - 32 U/L    AST 18 0 - 31 U/L   TSH without Reflex   Result Value Ref Range    TSH 4.030 0.270 - 4.200 uIU/mL   T4   Result Value Ref Range    T4, Total 7.5 4.5 - 11.7 mcg/dL   Urinalysis, reflex to microscopic   Result Value Ref Range    Color, UA RED (A) Straw/Yellow    Clarity, UA TURBID (A) Clear    Glucose, Ur Negative Negative mg/dL    Bilirubin Urine Negative Negative    Ketones, Urine TRACE (A) Negative mg/dL    Specific Gravity, UA >=1.030 1.005 - 1.030    Blood, Urine LARGE (A) Negative    pH, UA 5.0 5.0 - 9.0    Protein, UA >=300 (A) Negative mg/dL    Urobilinogen, Urine 2.0 (A) <2.0 E.U./dL    Nitrite, Urine POSITIVE (A) Negative    Leukocyte Esterase, Urine SMALL (A) Negative   Pregnancy, Urine   Result Value Ref Range    HCG(Urine) Pregnancy Test NEGATIVE NEGATIVE   Urine Drug Screen   Result Value Ref Range    Amphetamine Screen, Urine NOT DETECTED Negative <1000 ng/mL    Barbiturate Screen, Ur NOT DETECTED Negative < 200 ng/mL    Benzodiazepine Screen, Urine NOT DETECTED Negative < 200 ng/mL    Cannabinoid Scrn, Ur NOT DETECTED Negative < 50ng/mL    Cocaine Metabolite Screen, Urine NOT DETECTED Negative < 300 ng/mL    Opiate Scrn, Ur NOT DETECTED Negative < 300ng/mL    PCP Screen, Urine NOT DETECTED Negative < 25 ng/mL    Methadone Screen, Urine NOT DETECTED Negative <300 ng/mL    Oxycodone Urine NOT DETECTED Negative <100 ng/mL    FENTANYL SCREEN, URINE NOT DETECTED Negative <1 ng/mL    Drug Screen Comment: see below    Serum Drug Screen   Result Value Ref Range    Ethanol Lvl <10 mg/dL    Acetaminophen Level <5.0 (L) 10.0 - 95.3 mcg/mL    Salicylate, Serum <9.2 0.0 - 30.0 mg/dL    TCA Scrn NEGATIVE with the plan.       --------------------------------- IMPRESSION AND DISPOSITION ---------------------------------    IMPRESSION  1. Depression with suicidal ideation        DISPOSITION  Disposition: Transfer to [facility] Patient condition is stable    NOTE: This report was transcribed using voice recognition software.  Every effort was made to ensure accuracy; however, inadvertent computerized transcription errors may be present        Bryn Medina MD  01/08/20 4734

## 2020-02-04 ENCOUNTER — OFFICE VISIT (OUTPATIENT)
Dept: OBGYN | Age: 30
End: 2020-02-04
Payer: MEDICARE

## 2020-02-04 VITALS
DIASTOLIC BLOOD PRESSURE: 68 MMHG | BODY MASS INDEX: 40.79 KG/M2 | HEART RATE: 88 BPM | SYSTOLIC BLOOD PRESSURE: 146 MMHG | WEIGHT: 223 LBS

## 2020-02-04 PROCEDURE — 99203 OFFICE O/P NEW LOW 30 MIN: CPT | Performed by: NURSE PRACTITIONER

## 2020-02-04 ASSESSMENT — ENCOUNTER SYMPTOMS: RESPIRATORY NEGATIVE: 1

## 2020-02-04 NOTE — PROGRESS NOTES
Subjective:      Patient ID: Ramon Wharton is a 30 y.o. female. HPI: Patient being seen today for vaginal bleeding. Patient been bleeding on and off since October. History of STD's: yes 2018 trichomonas  Sexually active: no  Last pap smear: 2018 results: unknown    Patient has history Bi Polar  Reports a hx of schizophrenia       Review of Systems   Respiratory: Negative. Cardiovascular: Negative. Genitourinary: Negative. Psychiatric/Behavioral: The patient is nervous/anxious. Objective:   Physical Exam  Vitals signs and nursing note reviewed. Exam conducted with a chaperone present. Constitutional:       Appearance: She is obese. Cardiovascular:      Rate and Rhythm: Normal rate and regular rhythm. Pulmonary:      Breath sounds: Normal breath sounds. Abdominal:      General: Bowel sounds are normal.      Palpations: Abdomen is soft. Genitourinary:     Exam position: Lithotomy position. Pubic Area: No rash or pubic lice. Labia:         Right: No rash, tenderness, lesion or injury. Left: No rash, tenderness, lesion or injury. Vagina: Bleeding present. Cervix: Normal.      Uterus: Normal.       Adnexa: Left adnexa normal.        Right: Tenderness present. Rectum: Normal.   Skin:     General: Skin is warm. Neurological:      Mental Status: She is alert and oriented to person, place, and time. Assessment:       Diagnosis Orders   1. Vagina bleeding     2. DUB (dysfunctional uterine bleeding)             Plan:    Pap smear deferred because of vaginal bleeding   Patient instructed to log all vaginal bleeding. Patient instructed if bleeding becomes heavy a saturated pad in an hour seek medical attention ASAP  Patient will sign BILL from former GYN  Performed speculum examination and  Also will obtain urinalysis with microscopic and urine culture, which is pending at this time.  Recommended patient to follow up if she experiences any new or worsening complaints, otherwise will call with results and advise patient for further treatment. The patient was reminded of the importance of safe sexual practices including a mutually monogamous relationship and the use of barrier contraception. Patient schedule for pap smear in 4 weeks  All questions and concerns addressed. Patient voices understanding of plan of care.             Veda Pruitt, APRN - CNP

## 2020-02-05 ENCOUNTER — HOSPITAL ENCOUNTER (OUTPATIENT)
Dept: ULTRASOUND IMAGING | Age: 30
Discharge: HOME | End: 2020-02-07
Payer: MEDICARE

## 2020-02-05 ENCOUNTER — HOSPITAL ENCOUNTER (OUTPATIENT)
Age: 30
Discharge: HOME | End: 2020-02-05
Payer: MEDICARE

## 2020-02-05 LAB
HCT VFR BLD CALC: 28.4 % (ref 34–48)
HEMOGLOBIN: 7.5 G/DL (ref 11.5–15.5)
MCH RBC QN AUTO: 17.7 PG (ref 26–35)
MCHC RBC AUTO-ENTMCNC: 26.4 % (ref 32–34.5)
MCV RBC AUTO: 67.1 FL (ref 80–99.9)
PDW BLD-RTO: 18.3 FL (ref 11.5–15)
PLATELET # BLD: 538 E9/L (ref 130–450)
PMV BLD AUTO: 9.7 FL (ref 7–12)
RBC # BLD: 4.23 E12/L (ref 3.5–5.5)
WBC # BLD: 10.3 E9/L (ref 4.5–11.5)

## 2020-02-05 PROCEDURE — 36415 COLL VENOUS BLD VENIPUNCTURE: CPT

## 2020-02-05 PROCEDURE — 85027 COMPLETE CBC AUTOMATED: CPT

## 2020-02-05 PROCEDURE — 76830 TRANSVAGINAL US NON-OB: CPT

## 2020-02-05 PROCEDURE — 76856 US EXAM PELVIC COMPLETE: CPT

## 2020-02-07 RX ORDER — FERROUS SULFATE 325(65) MG
325 TABLET ORAL
Qty: 30 TABLET | Refills: 3 | Status: SHIPPED | OUTPATIENT
Start: 2020-02-07
